# Patient Record
Sex: FEMALE | Race: WHITE | NOT HISPANIC OR LATINO | Employment: OTHER | URBAN - METROPOLITAN AREA
[De-identification: names, ages, dates, MRNs, and addresses within clinical notes are randomized per-mention and may not be internally consistent; named-entity substitution may affect disease eponyms.]

---

## 2018-01-23 ENCOUNTER — HOSPITAL ENCOUNTER (OUTPATIENT)
Dept: LAB | Facility: MEDICAL CENTER | Age: 68
End: 2018-01-23
Attending: NURSE PRACTITIONER
Payer: MEDICARE

## 2018-01-23 LAB
ALBUMIN SERPL BCP-MCNC: 4.3 G/DL (ref 3.2–4.9)
ALBUMIN/GLOB SERPL: 1.2 G/DL
ALP SERPL-CCNC: 78 U/L (ref 30–99)
ALT SERPL-CCNC: 23 U/L (ref 2–50)
ANION GAP SERPL CALC-SCNC: 6 MMOL/L (ref 0–11.9)
APPEARANCE UR: CLEAR
AST SERPL-CCNC: 25 U/L (ref 12–45)
BASOPHILS # BLD AUTO: 0.5 % (ref 0–1.8)
BASOPHILS # BLD: 0.04 K/UL (ref 0–0.12)
BILIRUB SERPL-MCNC: 0.3 MG/DL (ref 0.1–1.5)
BILIRUB UR QL STRIP.AUTO: NEGATIVE
BUN SERPL-MCNC: 15 MG/DL (ref 8–22)
CALCIUM SERPL-MCNC: 9.4 MG/DL (ref 8.5–10.5)
CHLORIDE SERPL-SCNC: 102 MMOL/L (ref 96–112)
CHOLEST SERPL-MCNC: 242 MG/DL (ref 100–199)
CO2 SERPL-SCNC: 30 MMOL/L (ref 20–33)
COLOR UR: YELLOW
CREAT SERPL-MCNC: 0.65 MG/DL (ref 0.5–1.4)
CULTURE IF INDICATED INDCX: NO UA CULTURE
EOSINOPHIL # BLD AUTO: 0.29 K/UL (ref 0–0.51)
EOSINOPHIL NFR BLD: 3.8 % (ref 0–6.9)
ERYTHROCYTE [DISTWIDTH] IN BLOOD BY AUTOMATED COUNT: 42.4 FL (ref 35.9–50)
GLOBULIN SER CALC-MCNC: 3.6 G/DL (ref 1.9–3.5)
GLUCOSE SERPL-MCNC: 92 MG/DL (ref 65–99)
GLUCOSE UR STRIP.AUTO-MCNC: NEGATIVE MG/DL
HCT VFR BLD AUTO: 51.3 % (ref 37–47)
HDLC SERPL-MCNC: 64 MG/DL
HGB BLD-MCNC: 16.4 G/DL (ref 12–16)
IMM GRANULOCYTES # BLD AUTO: 0.03 K/UL (ref 0–0.11)
IMM GRANULOCYTES NFR BLD AUTO: 0.4 % (ref 0–0.9)
KETONES UR STRIP.AUTO-MCNC: NEGATIVE MG/DL
LDLC SERPL CALC-MCNC: 156 MG/DL
LEUKOCYTE ESTERASE UR QL STRIP.AUTO: NEGATIVE
LYMPHOCYTES # BLD AUTO: 2.1 K/UL (ref 1–4.8)
LYMPHOCYTES NFR BLD: 27.7 % (ref 22–41)
MCH RBC QN AUTO: 28.9 PG (ref 27–33)
MCHC RBC AUTO-ENTMCNC: 32 G/DL (ref 33.6–35)
MCV RBC AUTO: 90.3 FL (ref 81.4–97.8)
MICRO URNS: NORMAL
MONOCYTES # BLD AUTO: 0.52 K/UL (ref 0–0.85)
MONOCYTES NFR BLD AUTO: 6.9 % (ref 0–13.4)
NEUTROPHILS # BLD AUTO: 4.59 K/UL (ref 2–7.15)
NEUTROPHILS NFR BLD: 60.7 % (ref 44–72)
NITRITE UR QL STRIP.AUTO: NEGATIVE
NRBC # BLD AUTO: 0 K/UL
NRBC BLD-RTO: 0 /100 WBC
PH UR STRIP.AUTO: 6 [PH]
PLATELET # BLD AUTO: 255 K/UL (ref 164–446)
PMV BLD AUTO: 12 FL (ref 9–12.9)
POTASSIUM SERPL-SCNC: 4.5 MMOL/L (ref 3.6–5.5)
PROT SERPL-MCNC: 7.9 G/DL (ref 6–8.2)
PROT UR QL STRIP: NEGATIVE MG/DL
RBC # BLD AUTO: 5.68 M/UL (ref 4.2–5.4)
RBC UR QL AUTO: NEGATIVE
SODIUM SERPL-SCNC: 138 MMOL/L (ref 135–145)
SP GR UR STRIP.AUTO: 1.03
T4 FREE SERPL-MCNC: 0.81 NG/DL (ref 0.53–1.43)
TRIGL SERPL-MCNC: 111 MG/DL (ref 0–149)
TSH SERPL DL<=0.005 MIU/L-ACNC: 1.73 UIU/ML (ref 0.38–5.33)
UROBILINOGEN UR STRIP.AUTO-MCNC: 0.2 MG/DL
VIT B12 SERPL-MCNC: 745 PG/ML (ref 211–911)
WBC # BLD AUTO: 7.6 K/UL (ref 4.8–10.8)

## 2018-01-23 PROCEDURE — 82607 VITAMIN B-12: CPT

## 2018-01-23 PROCEDURE — 80061 LIPID PANEL: CPT

## 2018-01-23 PROCEDURE — 36415 COLL VENOUS BLD VENIPUNCTURE: CPT

## 2018-01-23 PROCEDURE — 84443 ASSAY THYROID STIM HORMONE: CPT

## 2018-01-23 PROCEDURE — 84439 ASSAY OF FREE THYROXINE: CPT

## 2018-01-23 PROCEDURE — 80053 COMPREHEN METABOLIC PANEL: CPT

## 2018-01-23 PROCEDURE — 81003 URINALYSIS AUTO W/O SCOPE: CPT

## 2018-01-23 PROCEDURE — 85025 COMPLETE CBC W/AUTO DIFF WBC: CPT

## 2018-03-23 ENCOUNTER — HOSPITAL ENCOUNTER (OUTPATIENT)
Facility: MEDICAL CENTER | Age: 68
End: 2018-03-23
Attending: NURSE PRACTITIONER
Payer: MEDICARE

## 2018-03-23 ENCOUNTER — OFFICE VISIT (OUTPATIENT)
Dept: URGENT CARE | Facility: CLINIC | Age: 68
End: 2018-03-23
Payer: MEDICARE

## 2018-03-23 VITALS
HEIGHT: 66 IN | OXYGEN SATURATION: 93 % | WEIGHT: 262.35 LBS | DIASTOLIC BLOOD PRESSURE: 82 MMHG | RESPIRATION RATE: 18 BRPM | SYSTOLIC BLOOD PRESSURE: 130 MMHG | HEART RATE: 82 BPM | BODY MASS INDEX: 42.16 KG/M2 | TEMPERATURE: 98.6 F

## 2018-03-23 DIAGNOSIS — T36.95XA ANTIBIOTIC-INDUCED YEAST INFECTION: ICD-10-CM

## 2018-03-23 DIAGNOSIS — B37.9 ANTIBIOTIC-INDUCED YEAST INFECTION: ICD-10-CM

## 2018-03-23 DIAGNOSIS — J02.9 SORE THROAT: ICD-10-CM

## 2018-03-23 DIAGNOSIS — J02.9 ACUTE PHARYNGITIS, UNSPECIFIED ETIOLOGY: ICD-10-CM

## 2018-03-23 LAB
INT CON NEG: NORMAL
INT CON POS: NORMAL
S PYO AG THROAT QL: NEGATIVE

## 2018-03-23 PROCEDURE — 87070 CULTURE OTHR SPECIMN AEROBIC: CPT

## 2018-03-23 PROCEDURE — 99204 OFFICE O/P NEW MOD 45 MIN: CPT | Performed by: NURSE PRACTITIONER

## 2018-03-23 PROCEDURE — 87880 STREP A ASSAY W/OPTIC: CPT | Performed by: NURSE PRACTITIONER

## 2018-03-23 PROCEDURE — 87077 CULTURE AEROBIC IDENTIFY: CPT

## 2018-03-23 PROCEDURE — 99000 SPECIMEN HANDLING OFFICE-LAB: CPT | Performed by: NURSE PRACTITIONER

## 2018-03-23 RX ORDER — PENICILLIN V POTASSIUM 500 MG/1
500 TABLET ORAL 3 TIMES DAILY
Qty: 30 TAB | Refills: 0 | Status: SHIPPED | OUTPATIENT
Start: 2018-03-23 | End: 2018-04-02

## 2018-03-23 RX ORDER — FLUCONAZOLE 150 MG/1
150 TABLET ORAL DAILY
Qty: 1 TAB | Refills: 0 | Status: SHIPPED | OUTPATIENT
Start: 2018-03-23 | End: 2021-04-29

## 2018-03-23 RX ORDER — AZITHROMYCIN 250 MG/1
TABLET, FILM COATED ORAL
COMMUNITY
Start: 2018-03-21 | End: 2021-04-29

## 2018-03-23 RX ORDER — OSELTAMIVIR PHOSPHATE 75 MG/1
CAPSULE ORAL
COMMUNITY
Start: 2018-03-21 | End: 2021-04-29

## 2018-03-23 RX ORDER — LISINOPRIL 10 MG/1
10 TABLET ORAL DAILY
COMMUNITY
End: 2021-04-29

## 2018-03-23 NOTE — PROGRESS NOTES
Chief Complaint   Patient presents with   • Pharyngitis     with difficulty swallowing, noticed white patches in throat x3 days - pt took tamiflu and azithromycin       HISTORY OF PRESENT ILLNESS: Patient is a 68 y.o. female who presents today due to complaints of a sore throat for the past three days. Reports associated fever, chills, pain with swallowing. Also admits to cough since onset but feel the cough is directly related to drainage from her throat. Pain is currently rated as significant. Denies associated congestion, ear pain, or difficulty breathing. Denies known ill contacts. She was seen by her PCP two days ago for the same. At that time she was placed on azithromycin and Tamiflu without testing for either strep or influenza. She reports worsening of symptoms since onset. If she receives antibiotics today she is requesting a prescription for Diflucan as she is prone to antibiotic-induced yeast infections.       Patient Active Problem List    Diagnosis Date Noted   • Insomnia 06/18/2014   • Depression 06/18/2014       Allergies:Patient has no known allergies.    Current Outpatient Prescriptions Ordered in [facility]   Medication Sig Dispense Refill   • lisinopril (PRINIVIL) 10 MG Tab Take 10 mg by mouth every day.     • maalox plus-benadryl-visc lidocaine (MAGIC MOUTHWASH) Take 5 mL by mouth every 6 hours as needed. 100 mL 0   • fluconazole (DIFLUCAN) 150 MG tablet Take 1 Tab by mouth every day. 1 Tab 0   • penicillin v potassium (VEETID) 500 MG Tab Take 1 Tab by mouth 3 times a day for 10 days. 30 Tab 0   • azithromycin (ZITHROMAX) 250 MG Tab      • oseltamivir (TAMIFLU) 75 MG Cap        No current Epic-ordered facility-administered medications on file.        Past Medical History:   Diagnosis Date   • Hypertension     off meds since 2012       Social History   Substance Use Topics   • Smoking status: Never Smoker   • Smokeless tobacco: Never Used   • Alcohol use Yes      Comment: socially        Family Status  "  Relation Status   • Mother    • Father Alive   • Sister    • Sister Alive     Family History   Problem Relation Age of Onset   • Cancer Mother      throat  - smoker   • Other Father      My. Gravis   • Cancer Sister      ovarian - age 67   • Cancer Sister      melanoma       ROS:  Review of Systems   Constitutional: Positive for fever, chills. Negative for weight loss and malaise/fatigue.   HENT: Positive for sore throat. Negative for ear pain, nosebleeds, congestion.   Eyes: Negative for vision changes.   Cardiovascular: Negative for chest pain, palpitations, orthopnea and leg swelling.   Respiratory: Positive for cough. Negative for sputum production, shortness of breath and wheezing.   Gastrointestinal: Negative for abdominal pain, nausea, vomiting or diarrhea.   Skin: Negative for rash, diaphoresis.     Exam:  Blood pressure 130/82, pulse 82, temperature 37 °C (98.6 °F), resp. rate 18, height 1.676 m (5' 5.98\"), weight 119 kg (262 lb 5.6 oz), SpO2 93 %.  General: well-nourished, well-developed female in NAD  Head: normocephalic, atraumatic  Eyes: PERRLA, no conjunctival injection, acuity grossly intact, lids normal.  Ears: normal shape and symmetry, no tenderness, no discharge. External canals are without any significant edema or erythema. Tympanic membranes are without any inflammation, no effusion. Gross auditory acuity is intact.  Nose: symmetrical without tenderness, no discharge.  Mouth/Throat: reasonable hygiene. There is erythema, without exudates, + tonsillar enlargement present +2.  Neck: no masses, range of motion within normal limits, no tracheal deviation. No obvious thyroid enlargement.   Lymph: bilateral anterior cervical adenopathy. No supraclavicular adenopathy.   Neuro: alert and oriented. Cranial nerves 1-12 grossly intact. No sensory deficit.   Cardiovascular: regular rate and rhythm. No edema.  Pulmonary: no distress. Chest is symmetrical with respiration, no wheezes, " crackles, or rhonchi.   Musculoskeletal: no clubbing, appropriate muscle tone, gait is stable.  Skin: warm, dry, intact, no clubbing, no cyanosis, no rashes.   Psych: appropriate mood, affect, judgement.         Assessment/Plan:  1. Acute pharyngitis, unspecified etiology  penicillin v potassium (VEETID) 500 MG Tab    CULTURE THROAT   2. Sore throat  POCT Rapid Strep A    maalox plus-benadryl-visc lidocaine (MAGIC MOUTHWASH)    penicillin v potassium (VEETID) 500 MG Tab    CULTURE THROAT   3. Antibiotic-induced yeast infection  fluconazole (DIFLUCAN) 150 MG tablet         POC strep negative      I discussed with the patient that her symptoms may be viral and/or bacterial without response to azithromycin. Ultrasound is obtained although may be negative due to prior antibiotic usage. She's been instructed to discontinue azithromycin and start penicillin instead for better coverage. I will also encourage the patient to discontinue Tamiflu as her symptoms are not appearing influenza-like. OTC motrin or tylenol for pain/fever control. Hand hygiene. Increase fluid intake, rest. Warm salt water gargles. Probiotic use strongly encouraged. Contingent Diflucan as directed. MBX PRN.  Supportive care, differential diagnoses, and indications for immediate follow-up discussed with patient.   Pathogenesis of diagnosis discussed including typical length and natural progression.   Instructed to return to clinic or nearest emergency department for any change in condition, further concerns, or worsening of symptoms.  Patient states understanding of the plan of care and discharge instructions.  Instructed to make an appointment, for follow up, with her primary care provider.        Please note that this dictation was created using voice recognition software. I have made every reasonable attempt to correct obvious errors, but I expect that there are errors of grammar and possibly content that I did not discover before finalizing the  note.      COSTA Josue.

## 2018-03-24 DIAGNOSIS — J02.9 ACUTE PHARYNGITIS, UNSPECIFIED ETIOLOGY: ICD-10-CM

## 2018-03-24 DIAGNOSIS — J02.9 SORE THROAT: ICD-10-CM

## 2018-03-26 LAB
BACTERIA SPEC RESP CULT: ABNORMAL
BACTERIA SPEC RESP CULT: ABNORMAL
SIGNIFICANT IND 70042: ABNORMAL
SITE SITE: ABNORMAL
SOURCE SOURCE: ABNORMAL

## 2018-03-28 ENCOUNTER — TELEPHONE (OUTPATIENT)
Dept: URGENT CARE | Facility: PHYSICIAN GROUP | Age: 68
End: 2018-03-28

## 2018-03-28 NOTE — TELEPHONE ENCOUNTER
The patient was called for re-evaluation, throat culture positive for strep C, patient prescribed PCN and encouraged to continue, a message was left, encouraged to call back to the clinic or return to clinic with any questions or concerns.

## 2018-04-05 ENCOUNTER — APPOINTMENT (RX ONLY)
Dept: URBAN - METROPOLITAN AREA CLINIC 4 | Facility: CLINIC | Age: 68
Setting detail: DERMATOLOGY
End: 2018-04-05

## 2018-04-05 DIAGNOSIS — L81.4 OTHER MELANIN HYPERPIGMENTATION: ICD-10-CM

## 2018-04-05 DIAGNOSIS — D18.0 HEMANGIOMA: ICD-10-CM

## 2018-04-05 DIAGNOSIS — D22 MELANOCYTIC NEVI: ICD-10-CM

## 2018-04-05 DIAGNOSIS — L82.1 OTHER SEBORRHEIC KERATOSIS: ICD-10-CM

## 2018-04-05 DIAGNOSIS — L73.8 OTHER SPECIFIED FOLLICULAR DISORDERS: ICD-10-CM

## 2018-04-05 PROBLEM — D22.61 MELANOCYTIC NEVI OF RIGHT UPPER LIMB, INCLUDING SHOULDER: Status: ACTIVE | Noted: 2018-04-05

## 2018-04-05 PROBLEM — I10 ESSENTIAL (PRIMARY) HYPERTENSION: Status: ACTIVE | Noted: 2018-04-05

## 2018-04-05 PROBLEM — D22.39 MELANOCYTIC NEVI OF OTHER PARTS OF FACE: Status: ACTIVE | Noted: 2018-04-05

## 2018-04-05 PROBLEM — D22.62 MELANOCYTIC NEVI OF LEFT UPPER LIMB, INCLUDING SHOULDER: Status: ACTIVE | Noted: 2018-04-05

## 2018-04-05 PROBLEM — D18.01 HEMANGIOMA OF SKIN AND SUBCUTANEOUS TISSUE: Status: ACTIVE | Noted: 2018-04-05

## 2018-04-05 PROCEDURE — 99202 OFFICE O/P NEW SF 15 MIN: CPT

## 2018-04-05 PROCEDURE — ? COUNSELING

## 2018-04-05 ASSESSMENT — LOCATION DETAILED DESCRIPTION DERM
LOCATION DETAILED: RIGHT CENTRAL MALAR CHEEK
LOCATION DETAILED: PERIUMBILICAL SKIN
LOCATION DETAILED: RIGHT DISTAL DORSAL FOREARM
LOCATION DETAILED: LEFT PROXIMAL DORSAL FOREARM
LOCATION DETAILED: LEFT CENTRAL MALAR CHEEK
LOCATION DETAILED: LEFT INFERIOR MEDIAL MALAR CHEEK
LOCATION DETAILED: RIGHT PROXIMAL DORSAL FOREARM
LOCATION DETAILED: LEFT INFERIOR CENTRAL MALAR CHEEK
LOCATION DETAILED: NASAL SUPRATIP
LOCATION DETAILED: LEFT DISTAL DORSAL FOREARM

## 2018-04-05 ASSESSMENT — LOCATION SIMPLE DESCRIPTION DERM
LOCATION SIMPLE: LEFT FOREARM
LOCATION SIMPLE: NOSE
LOCATION SIMPLE: RIGHT CHEEK
LOCATION SIMPLE: LEFT CHEEK
LOCATION SIMPLE: ABDOMEN
LOCATION SIMPLE: RIGHT FOREARM

## 2018-04-05 ASSESSMENT — LOCATION ZONE DERM
LOCATION ZONE: NOSE
LOCATION ZONE: ARM
LOCATION ZONE: TRUNK
LOCATION ZONE: FACE

## 2018-06-01 ENCOUNTER — HOSPITAL ENCOUNTER (OUTPATIENT)
Dept: LAB | Facility: MEDICAL CENTER | Age: 68
End: 2018-06-01
Attending: NURSE PRACTITIONER
Payer: MEDICARE

## 2018-06-01 LAB
ALBUMIN SERPL BCP-MCNC: 4 G/DL (ref 3.2–4.9)
ALBUMIN/GLOB SERPL: 1.1 G/DL
ALP SERPL-CCNC: 71 U/L (ref 30–99)
ALT SERPL-CCNC: 16 U/L (ref 2–50)
ANION GAP SERPL CALC-SCNC: 6 MMOL/L (ref 0–11.9)
AST SERPL-CCNC: 17 U/L (ref 12–45)
BASOPHILS # BLD AUTO: 0.9 % (ref 0–1.8)
BASOPHILS # BLD: 0.06 K/UL (ref 0–0.12)
BILIRUB SERPL-MCNC: 0.3 MG/DL (ref 0.1–1.5)
BUN SERPL-MCNC: 17 MG/DL (ref 8–22)
CALCIUM SERPL-MCNC: 9.5 MG/DL (ref 8.5–10.5)
CHLORIDE SERPL-SCNC: 101 MMOL/L (ref 96–112)
CHOLEST SERPL-MCNC: 222 MG/DL (ref 100–199)
CO2 SERPL-SCNC: 28 MMOL/L (ref 20–33)
CREAT SERPL-MCNC: 0.67 MG/DL (ref 0.5–1.4)
EOSINOPHIL # BLD AUTO: 0.3 K/UL (ref 0–0.51)
EOSINOPHIL NFR BLD: 4.4 % (ref 0–6.9)
ERYTHROCYTE [DISTWIDTH] IN BLOOD BY AUTOMATED COUNT: 44 FL (ref 35.9–50)
GLOBULIN SER CALC-MCNC: 3.5 G/DL (ref 1.9–3.5)
GLUCOSE SERPL-MCNC: 93 MG/DL (ref 65–99)
HCT VFR BLD AUTO: 51.2 % (ref 37–47)
HDLC SERPL-MCNC: 53 MG/DL
HGB BLD-MCNC: 15.7 G/DL (ref 12–16)
IMM GRANULOCYTES # BLD AUTO: 0.02 K/UL (ref 0–0.11)
IMM GRANULOCYTES NFR BLD AUTO: 0.3 % (ref 0–0.9)
LDLC SERPL CALC-MCNC: 147 MG/DL
LYMPHOCYTES # BLD AUTO: 2.19 K/UL (ref 1–4.8)
LYMPHOCYTES NFR BLD: 32.3 % (ref 22–41)
MCH RBC QN AUTO: 27.6 PG (ref 27–33)
MCHC RBC AUTO-ENTMCNC: 30.7 G/DL (ref 33.6–35)
MCV RBC AUTO: 90.1 FL (ref 81.4–97.8)
MONOCYTES # BLD AUTO: 0.42 K/UL (ref 0–0.85)
MONOCYTES NFR BLD AUTO: 6.2 % (ref 0–13.4)
NEUTROPHILS # BLD AUTO: 3.79 K/UL (ref 2–7.15)
NEUTROPHILS NFR BLD: 55.9 % (ref 44–72)
NRBC # BLD AUTO: 0 K/UL
NRBC BLD-RTO: 0 /100 WBC
PLATELET # BLD AUTO: 257 K/UL (ref 164–446)
PMV BLD AUTO: 12.3 FL (ref 9–12.9)
POTASSIUM SERPL-SCNC: 4.4 MMOL/L (ref 3.6–5.5)
PROT SERPL-MCNC: 7.5 G/DL (ref 6–8.2)
RBC # BLD AUTO: 5.68 M/UL (ref 4.2–5.4)
SODIUM SERPL-SCNC: 135 MMOL/L (ref 135–145)
TRIGL SERPL-MCNC: 112 MG/DL (ref 0–149)
WBC # BLD AUTO: 6.8 K/UL (ref 4.8–10.8)

## 2018-06-01 PROCEDURE — 80061 LIPID PANEL: CPT

## 2018-06-01 PROCEDURE — 36415 COLL VENOUS BLD VENIPUNCTURE: CPT

## 2018-06-01 PROCEDURE — 85025 COMPLETE CBC W/AUTO DIFF WBC: CPT

## 2018-06-01 PROCEDURE — 80053 COMPREHEN METABOLIC PANEL: CPT

## 2019-09-19 ENCOUNTER — HOSPITAL ENCOUNTER (OUTPATIENT)
Dept: LAB | Facility: MEDICAL CENTER | Age: 69
End: 2019-09-19
Attending: NURSE PRACTITIONER
Payer: MEDICARE

## 2019-09-19 LAB
25(OH)D3 SERPL-MCNC: 22 NG/ML (ref 30–100)
ALBUMIN SERPL BCP-MCNC: 4.5 G/DL (ref 3.2–4.9)
ALBUMIN/GLOB SERPL: 1.1 G/DL
ALP SERPL-CCNC: 95 U/L (ref 30–99)
ALT SERPL-CCNC: 91 U/L (ref 2–50)
ANION GAP SERPL CALC-SCNC: 9 MMOL/L (ref 0–11.9)
APPEARANCE UR: ABNORMAL
AST SERPL-CCNC: 64 U/L (ref 12–45)
BACTERIA #/AREA URNS HPF: NEGATIVE /HPF
BASOPHILS # BLD AUTO: 0.6 % (ref 0–1.8)
BASOPHILS # BLD: 0.04 K/UL (ref 0–0.12)
BILIRUB SERPL-MCNC: 0.5 MG/DL (ref 0.1–1.5)
BILIRUB UR QL STRIP.AUTO: NEGATIVE
BUN SERPL-MCNC: 16 MG/DL (ref 8–22)
CALCIUM SERPL-MCNC: 9.5 MG/DL (ref 8.5–10.5)
CHLORIDE SERPL-SCNC: 99 MMOL/L (ref 96–112)
CHOLEST SERPL-MCNC: 233 MG/DL (ref 100–199)
CO2 SERPL-SCNC: 27 MMOL/L (ref 20–33)
COLOR UR: YELLOW
COMMENT 1642: NORMAL
CREAT SERPL-MCNC: 0.73 MG/DL (ref 0.5–1.4)
EOSINOPHIL # BLD AUTO: 0.49 K/UL (ref 0–0.51)
EOSINOPHIL NFR BLD: 7.3 % (ref 0–6.9)
EPI CELLS #/AREA URNS HPF: NEGATIVE /HPF
ERYTHROCYTE [DISTWIDTH] IN BLOOD BY AUTOMATED COUNT: 44.3 FL (ref 35.9–50)
FASTING STATUS PATIENT QL REPORTED: NORMAL
GLOBULIN SER CALC-MCNC: 4 G/DL (ref 1.9–3.5)
GLUCOSE SERPL-MCNC: 93 MG/DL (ref 65–99)
GLUCOSE UR STRIP.AUTO-MCNC: NEGATIVE MG/DL
HCT VFR BLD AUTO: 51 % (ref 37–47)
HDLC SERPL-MCNC: 46 MG/DL
HGB BLD-MCNC: 15.9 G/DL (ref 12–16)
HYALINE CASTS #/AREA URNS LPF: ABNORMAL /LPF
IMM GRANULOCYTES # BLD AUTO: 0 K/UL (ref 0–0.11)
IMM GRANULOCYTES NFR BLD AUTO: 0 % (ref 0–0.9)
KETONES UR STRIP.AUTO-MCNC: 15 MG/DL
LDLC SERPL CALC-MCNC: 164 MG/DL
LEUKOCYTE ESTERASE UR QL STRIP.AUTO: NEGATIVE
LYMPHOCYTES # BLD AUTO: 1.58 K/UL (ref 1–4.8)
LYMPHOCYTES NFR BLD: 23.4 % (ref 22–41)
MCH RBC QN AUTO: 28.5 PG (ref 27–33)
MCHC RBC AUTO-ENTMCNC: 31.2 G/DL (ref 33.6–35)
MCV RBC AUTO: 91.4 FL (ref 81.4–97.8)
MICRO URNS: ABNORMAL
MONOCYTES # BLD AUTO: 0.49 K/UL (ref 0–0.85)
MONOCYTES NFR BLD AUTO: 7.3 % (ref 0–13.4)
MORPHOLOGY BLD-IMP: NORMAL
NEUTROPHILS # BLD AUTO: 4.15 K/UL (ref 2–7.15)
NEUTROPHILS NFR BLD: 61.4 % (ref 44–72)
NITRITE UR QL STRIP.AUTO: NEGATIVE
NRBC # BLD AUTO: 0 K/UL
NRBC BLD-RTO: 0 /100 WBC
PH UR STRIP.AUTO: 6 [PH] (ref 5–8)
PLATELET # BLD AUTO: 217 K/UL (ref 164–446)
PMV BLD AUTO: 12.7 FL (ref 9–12.9)
POTASSIUM SERPL-SCNC: 4.1 MMOL/L (ref 3.6–5.5)
PROT SERPL-MCNC: 8.5 G/DL (ref 6–8.2)
PROT UR QL STRIP: NEGATIVE MG/DL
RBC # BLD AUTO: 5.58 M/UL (ref 4.2–5.4)
RBC # URNS HPF: ABNORMAL /HPF
RBC UR QL AUTO: NEGATIVE
SODIUM SERPL-SCNC: 135 MMOL/L (ref 135–145)
SP GR UR STRIP.AUTO: 1.01
TRIGL SERPL-MCNC: 117 MG/DL (ref 0–149)
TSH SERPL DL<=0.005 MIU/L-ACNC: 2.46 UIU/ML (ref 0.38–5.33)
UROBILINOGEN UR STRIP.AUTO-MCNC: 0.2 MG/DL
WBC # BLD AUTO: 6.8 K/UL (ref 4.8–10.8)
WBC #/AREA URNS HPF: ABNORMAL /HPF

## 2019-09-19 PROCEDURE — 81001 URINALYSIS AUTO W/SCOPE: CPT

## 2019-09-19 PROCEDURE — 80061 LIPID PANEL: CPT

## 2019-09-19 PROCEDURE — 80053 COMPREHEN METABOLIC PANEL: CPT

## 2019-09-19 PROCEDURE — 82306 VITAMIN D 25 HYDROXY: CPT

## 2019-09-19 PROCEDURE — 84443 ASSAY THYROID STIM HORMONE: CPT

## 2019-09-19 PROCEDURE — 85025 COMPLETE CBC W/AUTO DIFF WBC: CPT

## 2019-09-19 PROCEDURE — 36415 COLL VENOUS BLD VENIPUNCTURE: CPT

## 2020-10-14 ENCOUNTER — HOSPITAL ENCOUNTER (OUTPATIENT)
Dept: LAB | Facility: MEDICAL CENTER | Age: 70
End: 2020-10-14
Attending: NURSE PRACTITIONER
Payer: MEDICARE

## 2020-10-14 PROCEDURE — 36415 COLL VENOUS BLD VENIPUNCTURE: CPT

## 2020-10-14 PROCEDURE — 81003 URINALYSIS AUTO W/O SCOPE: CPT

## 2020-10-14 PROCEDURE — 80053 COMPREHEN METABOLIC PANEL: CPT

## 2020-10-14 PROCEDURE — 82306 VITAMIN D 25 HYDROXY: CPT

## 2020-10-14 PROCEDURE — 84443 ASSAY THYROID STIM HORMONE: CPT

## 2020-10-14 PROCEDURE — 85025 COMPLETE CBC W/AUTO DIFF WBC: CPT

## 2020-10-14 PROCEDURE — 80061 LIPID PANEL: CPT

## 2020-10-15 LAB
ALBUMIN SERPL BCP-MCNC: 4.7 G/DL (ref 3.2–4.9)
ALBUMIN/GLOB SERPL: 1.3 G/DL
ALP SERPL-CCNC: 93 U/L (ref 30–99)
ALT SERPL-CCNC: 20 U/L (ref 2–50)
ANION GAP SERPL CALC-SCNC: 11 MMOL/L (ref 7–16)
APPEARANCE UR: CLEAR
AST SERPL-CCNC: 21 U/L (ref 12–45)
BASOPHILS # BLD AUTO: 0.7 % (ref 0–1.8)
BASOPHILS # BLD: 0.05 K/UL (ref 0–0.12)
BILIRUB SERPL-MCNC: 0.4 MG/DL (ref 0.1–1.5)
BILIRUB UR QL STRIP.AUTO: NEGATIVE
BUN SERPL-MCNC: 16 MG/DL (ref 8–22)
CALCIUM SERPL-MCNC: 9.6 MG/DL (ref 8.5–10.5)
CHLORIDE SERPL-SCNC: 98 MMOL/L (ref 96–112)
CHOLEST SERPL-MCNC: 197 MG/DL (ref 100–199)
CO2 SERPL-SCNC: 27 MMOL/L (ref 20–33)
COLOR UR: YELLOW
CREAT SERPL-MCNC: 0.62 MG/DL (ref 0.5–1.4)
EOSINOPHIL # BLD AUTO: 0.33 K/UL (ref 0–0.51)
EOSINOPHIL NFR BLD: 4.3 % (ref 0–6.9)
ERYTHROCYTE [DISTWIDTH] IN BLOOD BY AUTOMATED COUNT: 43.4 FL (ref 35.9–50)
FASTING STATUS PATIENT QL REPORTED: NORMAL
GLOBULIN SER CALC-MCNC: 3.5 G/DL (ref 1.9–3.5)
GLUCOSE SERPL-MCNC: 89 MG/DL (ref 65–99)
GLUCOSE UR STRIP.AUTO-MCNC: NEGATIVE MG/DL
HCT VFR BLD AUTO: 50.6 % (ref 37–47)
HDLC SERPL-MCNC: 64 MG/DL
HGB BLD-MCNC: 15.8 G/DL (ref 12–16)
IMM GRANULOCYTES # BLD AUTO: 0.03 K/UL (ref 0–0.11)
IMM GRANULOCYTES NFR BLD AUTO: 0.4 % (ref 0–0.9)
KETONES UR STRIP.AUTO-MCNC: NEGATIVE MG/DL
LDLC SERPL CALC-MCNC: 103 MG/DL
LEUKOCYTE ESTERASE UR QL STRIP.AUTO: NEGATIVE
LYMPHOCYTES # BLD AUTO: 2.45 K/UL (ref 1–4.8)
LYMPHOCYTES NFR BLD: 32.2 % (ref 22–41)
MCH RBC QN AUTO: 28.4 PG (ref 27–33)
MCHC RBC AUTO-ENTMCNC: 31.2 G/DL (ref 33.6–35)
MCV RBC AUTO: 91 FL (ref 81.4–97.8)
MICRO URNS: NORMAL
MONOCYTES # BLD AUTO: 0.45 K/UL (ref 0–0.85)
MONOCYTES NFR BLD AUTO: 5.9 % (ref 0–13.4)
NEUTROPHILS # BLD AUTO: 4.29 K/UL (ref 2–7.15)
NEUTROPHILS NFR BLD: 56.5 % (ref 44–72)
NITRITE UR QL STRIP.AUTO: NEGATIVE
NRBC # BLD AUTO: 0 K/UL
NRBC BLD-RTO: 0 /100 WBC
PH UR STRIP.AUTO: 6 [PH] (ref 5–8)
PLATELET # BLD AUTO: 267 K/UL (ref 164–446)
PMV BLD AUTO: 12.8 FL (ref 9–12.9)
POTASSIUM SERPL-SCNC: 4.1 MMOL/L (ref 3.6–5.5)
PROT SERPL-MCNC: 8.2 G/DL (ref 6–8.2)
PROT UR QL STRIP: NEGATIVE MG/DL
RBC # BLD AUTO: 5.56 M/UL (ref 4.2–5.4)
RBC UR QL AUTO: NEGATIVE
SODIUM SERPL-SCNC: 136 MMOL/L (ref 135–145)
SP GR UR STRIP.AUTO: 1.01
TRIGL SERPL-MCNC: 151 MG/DL (ref 0–149)
TSH SERPL DL<=0.005 MIU/L-ACNC: 2.39 UIU/ML (ref 0.38–5.33)
UROBILINOGEN UR STRIP.AUTO-MCNC: 0.2 MG/DL
WBC # BLD AUTO: 7.6 K/UL (ref 4.8–10.8)

## 2020-10-16 LAB — 25(OH)D3 SERPL-MCNC: 39 NG/ML (ref 30–100)

## 2021-04-29 ENCOUNTER — HOSPITAL ENCOUNTER (INPATIENT)
Facility: MEDICAL CENTER | Age: 71
LOS: 1 days | DRG: 287 | End: 2021-05-01
Attending: EMERGENCY MEDICINE | Admitting: INTERNAL MEDICINE
Payer: MEDICARE

## 2021-04-29 ENCOUNTER — APPOINTMENT (OUTPATIENT)
Dept: RADIOLOGY | Facility: MEDICAL CENTER | Age: 71
DRG: 287 | End: 2021-04-29
Attending: EMERGENCY MEDICINE
Payer: MEDICARE

## 2021-04-29 ENCOUNTER — OFFICE VISIT (OUTPATIENT)
Dept: CARDIOLOGY | Facility: MEDICAL CENTER | Age: 71
End: 2021-04-29
Payer: MEDICARE

## 2021-04-29 ENCOUNTER — APPOINTMENT (OUTPATIENT)
Dept: CARDIOLOGY | Facility: MEDICAL CENTER | Age: 71
DRG: 287 | End: 2021-04-29
Attending: INTERNAL MEDICINE
Payer: MEDICARE

## 2021-04-29 VITALS
WEIGHT: 266 LBS | BODY MASS INDEX: 44.32 KG/M2 | OXYGEN SATURATION: 93 % | SYSTOLIC BLOOD PRESSURE: 150 MMHG | DIASTOLIC BLOOD PRESSURE: 88 MMHG | HEIGHT: 65 IN | HEART RATE: 94 BPM

## 2021-04-29 DIAGNOSIS — I47.10 SVT (SUPRAVENTRICULAR TACHYCARDIA) (HCC): ICD-10-CM

## 2021-04-29 DIAGNOSIS — R00.2 PALPITATIONS: Primary | ICD-10-CM

## 2021-04-29 DIAGNOSIS — R01.1 UNDIAGNOSED CARDIAC MURMURS: ICD-10-CM

## 2021-04-29 DIAGNOSIS — E78.2 MIXED HYPERLIPIDEMIA: ICD-10-CM

## 2021-04-29 DIAGNOSIS — I20.0 UNSTABLE ANGINA PECTORIS (HCC): ICD-10-CM

## 2021-04-29 DIAGNOSIS — I10 ESSENTIAL HYPERTENSION: ICD-10-CM

## 2021-04-29 DIAGNOSIS — Z86.79 H/O CARDIAC ARRHYTHMIA: ICD-10-CM

## 2021-04-29 PROBLEM — R07.9 PAIN IN THE CHEST: Status: ACTIVE | Noted: 2021-04-29

## 2021-04-29 PROBLEM — F41.9 ANXIETY: Status: ACTIVE | Noted: 2021-04-29

## 2021-04-29 PROBLEM — I25.9 CHEST PAIN DUE TO MYOCARDIAL ISCHEMIA: Status: ACTIVE | Noted: 2021-04-29

## 2021-04-29 LAB
ALBUMIN SERPL BCP-MCNC: 4.5 G/DL (ref 3.2–4.9)
ALBUMIN/GLOB SERPL: 1.2 G/DL
ALP SERPL-CCNC: 97 U/L (ref 30–99)
ALT SERPL-CCNC: 18 U/L (ref 2–50)
ANION GAP SERPL CALC-SCNC: 11 MMOL/L (ref 7–16)
AST SERPL-CCNC: 20 U/L (ref 12–45)
BASOPHILS # BLD AUTO: 0.3 % (ref 0–1.8)
BASOPHILS # BLD: 0.03 K/UL (ref 0–0.12)
BILIRUB SERPL-MCNC: 0.3 MG/DL (ref 0.1–1.5)
BUN SERPL-MCNC: 20 MG/DL (ref 8–22)
CALCIUM SERPL-MCNC: 9.5 MG/DL (ref 8.5–10.5)
CHLORIDE SERPL-SCNC: 102 MMOL/L (ref 96–112)
CHOLEST SERPL-MCNC: 159 MG/DL (ref 100–199)
CO2 SERPL-SCNC: 25 MMOL/L (ref 20–33)
CREAT SERPL-MCNC: 0.61 MG/DL (ref 0.5–1.4)
EKG IMPRESSION: NORMAL
EOSINOPHIL # BLD AUTO: 0.15 K/UL (ref 0–0.51)
EOSINOPHIL NFR BLD: 1.6 % (ref 0–6.9)
ERYTHROCYTE [DISTWIDTH] IN BLOOD BY AUTOMATED COUNT: 40.5 FL (ref 35.9–50)
GLOBULIN SER CALC-MCNC: 3.9 G/DL (ref 1.9–3.5)
GLUCOSE SERPL-MCNC: 100 MG/DL (ref 65–99)
HCT VFR BLD AUTO: 45.8 % (ref 37–47)
HDLC SERPL-MCNC: 65 MG/DL
HGB BLD-MCNC: 14.6 G/DL (ref 12–16)
IMM GRANULOCYTES # BLD AUTO: 0.03 K/UL (ref 0–0.11)
IMM GRANULOCYTES NFR BLD AUTO: 0.3 % (ref 0–0.9)
LDLC SERPL CALC-MCNC: 81 MG/DL
LV EJECT FRACT  99904: 70
LV EJECT FRACT MOD 2C 99903: 62.35
LYMPHOCYTES # BLD AUTO: 2.57 K/UL (ref 1–4.8)
LYMPHOCYTES NFR BLD: 26.6 % (ref 22–41)
MCH RBC QN AUTO: 28.6 PG (ref 27–33)
MCHC RBC AUTO-ENTMCNC: 31.9 G/DL (ref 33.6–35)
MCV RBC AUTO: 89.6 FL (ref 81.4–97.8)
MONOCYTES # BLD AUTO: 0.49 K/UL (ref 0–0.85)
MONOCYTES NFR BLD AUTO: 5.1 % (ref 0–13.4)
NEUTROPHILS # BLD AUTO: 6.4 K/UL (ref 2–7.15)
NEUTROPHILS NFR BLD: 66.1 % (ref 44–72)
NRBC # BLD AUTO: 0 K/UL
NRBC BLD-RTO: 0 /100 WBC
PLATELET # BLD AUTO: 260 K/UL (ref 164–446)
PMV BLD AUTO: 11.4 FL (ref 9–12.9)
POTASSIUM SERPL-SCNC: 4 MMOL/L (ref 3.6–5.5)
PROT SERPL-MCNC: 8.4 G/DL (ref 6–8.2)
RBC # BLD AUTO: 5.11 M/UL (ref 4.2–5.4)
SARS-COV+SARS-COV-2 AG RESP QL IA.RAPID: NOTDETECTED
SARS-COV-2 RNA RESP QL NAA+PROBE: NOTDETECTED
SODIUM SERPL-SCNC: 138 MMOL/L (ref 135–145)
SPECIMEN SOURCE: NORMAL
SPECIMEN SOURCE: NORMAL
TRIGL SERPL-MCNC: 64 MG/DL (ref 0–149)
TROPONIN T SERPL-MCNC: 6 NG/L (ref 6–19)
TSH SERPL DL<=0.005 MIU/L-ACNC: 1.75 UIU/ML (ref 0.38–5.33)
WBC # BLD AUTO: 9.7 K/UL (ref 4.8–10.8)

## 2021-04-29 PROCEDURE — U0005 INFEC AGEN DETEC AMPLI PROBE: HCPCS

## 2021-04-29 PROCEDURE — 93005 ELECTROCARDIOGRAM TRACING: CPT | Performed by: EMERGENCY MEDICINE

## 2021-04-29 PROCEDURE — U0003 INFECTIOUS AGENT DETECTION BY NUCLEIC ACID (DNA OR RNA); SEVERE ACUTE RESPIRATORY SYNDROME CORONAVIRUS 2 (SARS-COV-2) (CORONAVIRUS DISEASE [COVID-19]), AMPLIFIED PROBE TECHNIQUE, MAKING USE OF HIGH THROUGHPUT TECHNOLOGIES AS DESCRIBED BY CMS-2020-01-R: HCPCS

## 2021-04-29 PROCEDURE — 700117 HCHG RX CONTRAST REV CODE 255: Performed by: INTERNAL MEDICINE

## 2021-04-29 PROCEDURE — 99220 PR INITIAL OBSERVATION CARE,LEVL III: CPT | Performed by: INTERNAL MEDICINE

## 2021-04-29 PROCEDURE — 84484 ASSAY OF TROPONIN QUANT: CPT

## 2021-04-29 PROCEDURE — 80061 LIPID PANEL: CPT

## 2021-04-29 PROCEDURE — 71045 X-RAY EXAM CHEST 1 VIEW: CPT

## 2021-04-29 PROCEDURE — 93306 TTE W/DOPPLER COMPLETE: CPT | Mod: 26 | Performed by: INTERNAL MEDICINE

## 2021-04-29 PROCEDURE — 93000 ELECTROCARDIOGRAM COMPLETE: CPT | Performed by: INTERNAL MEDICINE

## 2021-04-29 PROCEDURE — 99285 EMERGENCY DEPT VISIT HI MDM: CPT

## 2021-04-29 PROCEDURE — 700102 HCHG RX REV CODE 250 W/ 637 OVERRIDE(OP): Performed by: INTERNAL MEDICINE

## 2021-04-29 PROCEDURE — 93005 ELECTROCARDIOGRAM TRACING: CPT

## 2021-04-29 PROCEDURE — 84443 ASSAY THYROID STIM HORMONE: CPT

## 2021-04-29 PROCEDURE — C9803 HOPD COVID-19 SPEC COLLECT: HCPCS | Performed by: EMERGENCY MEDICINE

## 2021-04-29 PROCEDURE — 93306 TTE W/DOPPLER COMPLETE: CPT

## 2021-04-29 PROCEDURE — A9270 NON-COVERED ITEM OR SERVICE: HCPCS | Performed by: INTERNAL MEDICINE

## 2021-04-29 PROCEDURE — 80053 COMPREHEN METABOLIC PANEL: CPT

## 2021-04-29 PROCEDURE — 94760 N-INVAS EAR/PLS OXIMETRY 1: CPT

## 2021-04-29 PROCEDURE — G0378 HOSPITAL OBSERVATION PER HR: HCPCS

## 2021-04-29 PROCEDURE — 85025 COMPLETE CBC W/AUTO DIFF WBC: CPT

## 2021-04-29 PROCEDURE — 87426 SARSCOV CORONAVIRUS AG IA: CPT

## 2021-04-29 PROCEDURE — 36415 COLL VENOUS BLD VENIPUNCTURE: CPT

## 2021-04-29 PROCEDURE — 99205 OFFICE O/P NEW HI 60 MIN: CPT | Performed by: INTERNAL MEDICINE

## 2021-04-29 RX ORDER — CETIRIZINE HYDROCHLORIDE 10 MG/1
10 TABLET ORAL DAILY
COMMUNITY

## 2021-04-29 RX ORDER — CETIRIZINE HYDROCHLORIDE 10 MG/1
10 TABLET ORAL DAILY
Status: DISCONTINUED | OUTPATIENT
Start: 2021-04-30 | End: 2021-05-01 | Stop reason: HOSPADM

## 2021-04-29 RX ORDER — AMOXICILLIN 250 MG
2 CAPSULE ORAL 2 TIMES DAILY
Status: DISCONTINUED | OUTPATIENT
Start: 2021-04-30 | End: 2021-05-01 | Stop reason: HOSPADM

## 2021-04-29 RX ORDER — ACETAMINOPHEN 325 MG/1
650 TABLET ORAL EVERY 6 HOURS PRN
Status: DISCONTINUED | OUTPATIENT
Start: 2021-04-29 | End: 2021-05-01 | Stop reason: HOSPADM

## 2021-04-29 RX ORDER — ASPIRIN 81 MG/1
324 TABLET, CHEWABLE ORAL ONCE
Status: DISCONTINUED | OUTPATIENT
Start: 2021-04-29 | End: 2021-04-29

## 2021-04-29 RX ORDER — VITAMIN B COMPLEX
1000 TABLET ORAL DAILY
Status: DISCONTINUED | OUTPATIENT
Start: 2021-04-30 | End: 2021-05-01 | Stop reason: HOSPADM

## 2021-04-29 RX ORDER — LABETALOL HYDROCHLORIDE 5 MG/ML
10 INJECTION, SOLUTION INTRAVENOUS EVERY 4 HOURS PRN
Status: DISCONTINUED | OUTPATIENT
Start: 2021-04-29 | End: 2021-05-01 | Stop reason: HOSPADM

## 2021-04-29 RX ORDER — NITROGLYCERIN 0.4 MG/1
0.4 TABLET SUBLINGUAL
Status: DISCONTINUED | OUTPATIENT
Start: 2021-04-29 | End: 2021-05-01 | Stop reason: HOSPADM

## 2021-04-29 RX ORDER — ROSUVASTATIN CALCIUM 20 MG/1
20 TABLET, COATED ORAL EVERY EVENING
Status: DISCONTINUED | OUTPATIENT
Start: 2021-04-29 | End: 2021-04-29

## 2021-04-29 RX ORDER — LORAZEPAM 0.5 MG/1
0.5 TABLET ORAL EVERY 6 HOURS PRN
Status: DISCONTINUED | OUTPATIENT
Start: 2021-04-29 | End: 2021-04-30

## 2021-04-29 RX ORDER — ONDANSETRON 4 MG/1
4 TABLET, ORALLY DISINTEGRATING ORAL EVERY 4 HOURS PRN
Status: DISCONTINUED | OUTPATIENT
Start: 2021-04-29 | End: 2021-05-01 | Stop reason: HOSPADM

## 2021-04-29 RX ORDER — POLYETHYLENE GLYCOL 3350 17 G/17G
1 POWDER, FOR SOLUTION ORAL
Status: DISCONTINUED | OUTPATIENT
Start: 2021-04-29 | End: 2021-05-01 | Stop reason: HOSPADM

## 2021-04-29 RX ORDER — ATORVASTATIN CALCIUM 10 MG/1
10 TABLET, FILM COATED ORAL
COMMUNITY
Start: 2021-04-11 | End: 2022-08-30 | Stop reason: SDUPTHER

## 2021-04-29 RX ORDER — LOSARTAN POTASSIUM 50 MG/1
50 TABLET ORAL
Status: DISCONTINUED | OUTPATIENT
Start: 2021-04-30 | End: 2021-05-01

## 2021-04-29 RX ORDER — ASCORBIC ACID 500 MG
1000 TABLET ORAL DAILY
COMMUNITY

## 2021-04-29 RX ORDER — ATORVASTATIN CALCIUM 10 MG/1
10 TABLET, FILM COATED ORAL
Status: DISCONTINUED | OUTPATIENT
Start: 2021-04-29 | End: 2021-05-01 | Stop reason: HOSPADM

## 2021-04-29 RX ORDER — ONDANSETRON 2 MG/ML
4 INJECTION INTRAMUSCULAR; INTRAVENOUS EVERY 4 HOURS PRN
Status: DISCONTINUED | OUTPATIENT
Start: 2021-04-29 | End: 2021-05-01 | Stop reason: HOSPADM

## 2021-04-29 RX ORDER — ASCORBIC ACID 500 MG
1000 TABLET ORAL DAILY
Status: DISCONTINUED | OUTPATIENT
Start: 2021-04-30 | End: 2021-05-01 | Stop reason: HOSPADM

## 2021-04-29 RX ORDER — BISACODYL 10 MG
10 SUPPOSITORY, RECTAL RECTAL
Status: DISCONTINUED | OUTPATIENT
Start: 2021-04-29 | End: 2021-05-01 | Stop reason: HOSPADM

## 2021-04-29 RX ORDER — LOSARTAN POTASSIUM 50 MG/1
50 TABLET ORAL
Status: ON HOLD | COMMUNITY
Start: 2021-04-19 | End: 2021-05-01 | Stop reason: SDUPTHER

## 2021-04-29 RX ORDER — ASPIRIN 81 MG/1
81 TABLET, CHEWABLE ORAL EVERY EVENING
Status: DISCONTINUED | OUTPATIENT
Start: 2021-04-29 | End: 2021-05-01 | Stop reason: HOSPADM

## 2021-04-29 RX ORDER — ASPIRIN 81 MG/1
81 TABLET, CHEWABLE ORAL EVERY EVENING
COMMUNITY

## 2021-04-29 RX ADMIN — HUMAN ALBUMIN MICROSPHERES AND PERFLUTREN 3 ML: 10; .22 INJECTION, SOLUTION INTRAVENOUS at 18:09

## 2021-04-29 RX ADMIN — ACETAMINOPHEN 650 MG: 325 TABLET, FILM COATED ORAL at 20:18

## 2021-04-29 RX ADMIN — ASPIRIN 81 MG: 81 TABLET, CHEWABLE ORAL at 20:18

## 2021-04-29 RX ADMIN — NITROGLYCERIN 0.5 INCH: 20 OINTMENT TOPICAL at 21:26

## 2021-04-29 RX ADMIN — ATORVASTATIN CALCIUM 10 MG: 10 TABLET, FILM COATED ORAL at 20:18

## 2021-04-29 RX ADMIN — LORAZEPAM 0.5 MG: 0.5 TABLET ORAL at 20:17

## 2021-04-29 ASSESSMENT — COPD QUESTIONNAIRES
DO YOU EVER COUGH UP ANY MUCUS OR PHLEGM?: YES, A FEW DAYS A WEEK OR MONTH
HAVE YOU SMOKED AT LEAST 100 CIGARETTES IN YOUR ENTIRE LIFE: NO/DON'T KNOW
COPD SCREENING SCORE: 5
DURING THE PAST 4 WEEKS HOW MUCH DID YOU FEEL SHORT OF BREATH: SOME OF THE TIME

## 2021-04-29 ASSESSMENT — ENCOUNTER SYMPTOMS
DOUBLE VISION: 0
FLANK PAIN: 0
VOMITING: 0
HEARTBURN: 0
CHILLS: 0
NAUSEA: 0
BRUISES/BLEEDS EASILY: 0
FEVER: 0
WEAKNESS: 0
BACK PAIN: 0
COUGH: 0
SPEECH CHANGE: 0
SPUTUM PRODUCTION: 0
POLYDIPSIA: 0
CONSTITUTIONAL NEGATIVE: 1
DIZZINESS: 0
HALLUCINATIONS: 0
EYES NEGATIVE: 1
NEUROLOGICAL NEGATIVE: 1
ORTHOPNEA: 0
PALPITATIONS: 1
MUSCULOSKELETAL NEGATIVE: 1
PHOTOPHOBIA: 0
GASTROINTESTINAL NEGATIVE: 1
ORTHOPNEA: 0
DIZZINESS: 1
FEVER: 0
FOCAL WEAKNESS: 0
LOSS OF CONSCIOUSNESS: 0
PND: 0
COUGH: 0
WEIGHT LOSS: 0
WHEEZING: 0
CLAUDICATION: 0
BRUISES/BLEEDS EASILY: 0
CHILLS: 0
HEADACHES: 0
BLURRED VISION: 0
PALPITATIONS: 0
STRIDOR: 0
SHORTNESS OF BREATH: 1
TREMORS: 0
NECK PAIN: 0
HEMOPTYSIS: 0
SORE THROAT: 0
NERVOUS/ANXIOUS: 0
SPUTUM PRODUCTION: 0
HEMOPTYSIS: 0

## 2021-04-29 ASSESSMENT — COGNITIVE AND FUNCTIONAL STATUS - GENERAL
SUGGESTED CMS G CODE MODIFIER MOBILITY: CH
MOBILITY SCORE: 24
SUGGESTED CMS G CODE MODIFIER DAILY ACTIVITY: CH
DAILY ACTIVITIY SCORE: 24

## 2021-04-29 ASSESSMENT — LIFESTYLE VARIABLES
TOTAL SCORE: 0
AVERAGE NUMBER OF DAYS PER WEEK YOU HAVE A DRINK CONTAINING ALCOHOL: 0
EVER HAD A DRINK FIRST THING IN THE MORNING TO STEADY YOUR NERVES TO GET RID OF A HANGOVER: NO
HAVE YOU EVER FELT YOU SHOULD CUT DOWN ON YOUR DRINKING: NO
EVER FELT BAD OR GUILTY ABOUT YOUR DRINKING: NO
CONSUMPTION TOTAL: NEGATIVE
ALCOHOL_USE: YES
SUBSTANCE_ABUSE: 0
HAVE PEOPLE ANNOYED YOU BY CRITICIZING YOUR DRINKING: NO
TOTAL SCORE: 0
HOW MANY TIMES IN THE PAST YEAR HAVE YOU HAD 5 OR MORE DRINKS IN A DAY: 0
TOTAL SCORE: 0
DOES PATIENT WANT TO STOP DRINKING: CANNOT ASSESS
ON A TYPICAL DAY WHEN YOU DRINK ALCOHOL HOW MANY DRINKS DO YOU HAVE: 3

## 2021-04-29 ASSESSMENT — PAIN DESCRIPTION - PAIN TYPE
TYPE: ACUTE PAIN
TYPE: ACUTE PAIN

## 2021-04-29 ASSESSMENT — PATIENT HEALTH QUESTIONNAIRE - PHQ9
3. TROUBLE FALLING OR STAYING ASLEEP OR SLEEPING TOO MUCH: NOT AT ALL
9. THOUGHTS THAT YOU WOULD BE BETTER OFF DEAD, OR OF HURTING YOURSELF: NOT AT ALL
5. POOR APPETITE OR OVEREATING: NOT AT ALL
4. FEELING TIRED OR HAVING LITTLE ENERGY: MORE THAN HALF THE DAYS
SUM OF ALL RESPONSES TO PHQ QUESTIONS 1-9: 3
8. MOVING OR SPEAKING SO SLOWLY THAT OTHER PEOPLE COULD HAVE NOTICED. OR THE OPPOSITE, BEING SO FIGETY OR RESTLESS THAT YOU HAVE BEEN MOVING AROUND A LOT MORE THAN USUAL: NOT AT ALL
1. LITTLE INTEREST OR PLEASURE IN DOING THINGS: NOT AT ALL
6. FEELING BAD ABOUT YOURSELF - OR THAT YOU ARE A FAILURE OR HAVE LET YOURSELF OR YOUR FAMILY DOWN: NOT AL ALL
7. TROUBLE CONCENTRATING ON THINGS, SUCH AS READING THE NEWSPAPER OR WATCHING TELEVISION: NOT AT ALL
2. FEELING DOWN, DEPRESSED, IRRITABLE, OR HOPELESS: SEVERAL DAYS
SUM OF ALL RESPONSES TO PHQ9 QUESTIONS 1 AND 2: 1

## 2021-04-29 ASSESSMENT — FIBROSIS 4 INDEX
FIB4 SCORE: 1.25
FIB4 SCORE: 1.29
FIB4 SCORE: 1.25

## 2021-04-29 NOTE — PROGRESS NOTES
Chief Complaint   Patient presents with   • Establish Care       Subjective:   Anel Schaeffer is a 71 y.o. female who presents today consultation for chest pain and palpitations.  She is a 71-year-old female who most recently was told that she had a cardiac murmur.  She has been profoundly fatigued and lacking energy.  She is also been complaining of chest pain and chest pressure with exertion which lasts 1 to 2 minutes and goes away with rest.  She has never had an echocardiogram.  She does have some high blood pressure hyperlipidemia she does not smoke drink or do drugs.  She has no premature family history disease.    Past Medical History:   Diagnosis Date   • Hypertension     off meds since 2012     Past Surgical History:   Procedure Laterality Date   • ABDOMINAL HYSTERECTOMY TOTAL      1989   • APPENDECTOMY      1966   • HUMERUS ORIF      3/2014  - right   • LIPOMA EXCISION      left leg   • SHOULDER ARTHROSCOPY      right - bone spurs.  [facility] - ortho   • TONSILLECTOMY      1956   • ULNA ORIF      left - 3/2014 -      Family History   Problem Relation Age of Onset   • Cancer Mother         throat  - smoker   • Other Father         My. Gravis   • Cancer Sister         ovarian - age 67   • Cancer Sister         melanoma     Social History     Socioeconomic History   • Marital status:      Spouse name: Not on file   • Number of children: Not on file   • Years of education: Not on file   • Highest education level: Not on file   Occupational History   • Not on file   Tobacco Use   • Smoking status: Never Smoker   • Smokeless tobacco: Never Used   Substance and Sexual Activity   • Alcohol use: Yes     Comment: socially    • Drug use: Yes     Frequency: 7.0 times per week     Types: Marijuana, Oral, Inhaled     Comment: for sleep    • Sexual activity: Yes     Birth control/protection: Surgical     Comment: ; two boys; wk:  [employer]   Other Topics Concern   • Not on file    Social History Narrative   • Not on file     Social Determinants of Health     Financial Resource Strain:    • Difficulty of Paying Living Expenses:    Food Insecurity:    • Worried About Running Out of Food in the Last Year:    • Ran Out of Food in the Last Year:    Transportation Needs:    • Lack of Transportation (Medical):    • Lack of Transportation (Non-Medical):    Physical Activity:    • Days of Exercise per Week:    • Minutes of Exercise per Session:    Stress:    • Feeling of Stress :    Social Connections:    • Frequency of Communication with Friends and Family:    • Frequency of Social Gatherings with Friends and Family:    • Attends Holiness Services:    • Active Member of Clubs or Organizations:    • Attends Club or Organization Meetings:    • Marital Status:    Intimate Partner Violence:    • Fear of Current or Ex-Partner:    • Emotionally Abused:    • Physically Abused:    • Sexually Abused:      No Known Allergies  Outpatient Encounter Medications as of 4/29/2021   Medication Sig Dispense Refill   • losartan (COZAAR) 50 MG Tab Take 50 mg by mouth every day.     • atorvastatin (LIPITOR) 10 MG Tab Take 10 mg by mouth.     • Cetirizine HCl (ZYRTEC ALLERGY) 10 MG Cap Take  by mouth every day.     • aspirin (ASA) 81 MG Chew Tab chewable tablet Chew 81 mg every evening.     • [DISCONTINUED] azithromycin (ZITHROMAX) 250 MG Tab      • [DISCONTINUED] oseltamivir (TAMIFLU) 75 MG Cap      • [DISCONTINUED] lisinopril (PRINIVIL) 10 MG Tab Take 10 mg by mouth every day.     • [DISCONTINUED] maalox plus-benadryl-visc lidocaine (MAGIC MOUTHWASH) Take 5 mL by mouth every 6 hours as needed. (Patient not taking: Reported on 4/29/2021) 100 mL 0   • [DISCONTINUED] fluconazole (DIFLUCAN) 150 MG tablet Take 1 Tab by mouth every day. (Patient not taking: Reported on 4/29/2021) 1 Tab 0     No facility-administered encounter medications on file as of 4/29/2021.     Review of Systems   Constitutional: Negative.  Negative  "for chills, fever and malaise/fatigue.   HENT: Negative.  Negative for sore throat.    Eyes: Negative.    Respiratory: Positive for shortness of breath. Negative for cough, hemoptysis, sputum production, wheezing and stridor.    Cardiovascular: Positive for chest pain. Negative for palpitations, orthopnea, claudication, leg swelling and PND.   Gastrointestinal: Negative.    Genitourinary: Negative.    Musculoskeletal: Negative.    Skin: Negative.    Neurological: Negative.  Negative for dizziness, loss of consciousness and weakness.   Endo/Heme/Allergies: Negative.  Does not bruise/bleed easily.   All other systems reviewed and are negative.       Objective:   /88 (BP Location: Left arm, Patient Position: Sitting, BP Cuff Size: Adult)   Pulse 94   Ht 1.651 m (5' 5\")   Wt 121 kg (266 lb)   SpO2 93%   BMI 44.26 kg/m²     Physical Exam   Constitutional: She appears well-developed and well-nourished. No distress.   HENT:   Head: Normocephalic and atraumatic.   Right Ear: External ear normal.   Left Ear: External ear normal.   Nose: Nose normal.   Mouth/Throat: No oropharyngeal exudate.   Eyes: Pupils are equal, round, and reactive to light. Conjunctivae and EOM are normal. Right eye exhibits no discharge. Left eye exhibits no discharge. No scleral icterus.   Neck: No JVD present.   Cardiovascular: Normal rate, regular rhythm and intact distal pulses. Exam reveals no gallop and no friction rub.   Murmur heard.  Absent A2   Pulmonary/Chest: Effort normal. No stridor. No respiratory distress. She has no wheezes. She has no rales. She exhibits no tenderness.   Abdominal: Soft. She exhibits no distension. There is no guarding.   Musculoskeletal:         General: No tenderness, deformity or edema. Normal range of motion.      Cervical back: Neck supple.   Neurological: She is alert. She has normal reflexes. She displays normal reflexes. No cranial nerve deficit. She exhibits normal muscle tone. Coordination normal. "   Skin: Skin is warm and dry. No rash noted. She is not diaphoretic. No erythema. No pallor.   Psychiatric: She has a normal mood and affect. Her behavior is normal. Judgment and thought content normal.   Nursing note and vitals reviewed.      Assessment:     1. Palpitations  EKG   2. H/O cardiac arrhythmia     3. SVT (supraventricular tachycardia) (HCC)     4. Unstable angina pectoris (HCC)     5. Mixed hyperlipidemia     6. Essential hypertension     7. Undiagnosed cardiac murmurs         Medical Decision Making:  Today's Assessment / Status / Plan:   71-year-old female with a murmur concerning for moderate to severe aortic stenosis.  I discussed with her the work-up as an outpatient given her current chest pain which could include stress testing and waiting.  I am concerned about unstable angina as well.  We will have her go to the ER for her chest pain.  She needs a stat echocardiogram.  She will be n.p.o. after midnight for cardiac catheterization in the morning.

## 2021-04-29 NOTE — ED TRIAGE NOTES
Pt amb to triage after EKG.  Chief Complaint   Patient presents with   • Chest Pressure   • Rapid Heart Beat     intermittent x1mo     Pt reports she gets clammy during episodes.

## 2021-04-30 ENCOUNTER — APPOINTMENT (OUTPATIENT)
Dept: CARDIOLOGY | Facility: MEDICAL CENTER | Age: 71
DRG: 287 | End: 2021-04-30
Attending: INTERNAL MEDICINE
Payer: MEDICARE

## 2021-04-30 ENCOUNTER — APPOINTMENT (OUTPATIENT)
Dept: CARDIOLOGY | Facility: MEDICAL CENTER | Age: 71
DRG: 287 | End: 2021-04-30
Attending: NURSE PRACTITIONER
Payer: MEDICARE

## 2021-04-30 PROBLEM — E78.5 DYSLIPIDEMIA: Chronic | Status: ACTIVE | Noted: 2021-04-29

## 2021-04-30 PROBLEM — I20.0 UNSTABLE ANGINA (HCC): Status: ACTIVE | Noted: 2021-04-29

## 2021-04-30 PROBLEM — F12.90 MARIJUANA USE, CONTINUOUS: Chronic | Status: ACTIVE | Noted: 2021-04-30

## 2021-04-30 PROBLEM — E87.6 HYPOKALEMIA: Status: ACTIVE | Noted: 2021-04-30

## 2021-04-30 PROBLEM — J30.2 SEASONAL ALLERGIES: Chronic | Status: ACTIVE | Noted: 2021-04-30

## 2021-04-30 LAB
ALBUMIN SERPL BCP-MCNC: 3.9 G/DL (ref 3.2–4.9)
ALBUMIN/GLOB SERPL: 1.2 G/DL
ALP SERPL-CCNC: 82 U/L (ref 30–99)
ALT SERPL-CCNC: 13 U/L (ref 2–50)
ANION GAP SERPL CALC-SCNC: 8 MMOL/L (ref 7–16)
AST SERPL-CCNC: 15 U/L (ref 12–45)
BASOPHILS # BLD AUTO: 0.4 % (ref 0–1.8)
BASOPHILS # BLD: 0.03 K/UL (ref 0–0.12)
BILIRUB SERPL-MCNC: 0.4 MG/DL (ref 0.1–1.5)
BUN SERPL-MCNC: 15 MG/DL (ref 8–22)
CALCIUM SERPL-MCNC: 8.9 MG/DL (ref 8.5–10.5)
CHLORIDE SERPL-SCNC: 104 MMOL/L (ref 96–112)
CO2 SERPL-SCNC: 26 MMOL/L (ref 20–33)
CREAT SERPL-MCNC: 0.52 MG/DL (ref 0.5–1.4)
EKG IMPRESSION: NORMAL
EOSINOPHIL # BLD AUTO: 0.3 K/UL (ref 0–0.51)
EOSINOPHIL NFR BLD: 3.5 % (ref 0–6.9)
ERYTHROCYTE [DISTWIDTH] IN BLOOD BY AUTOMATED COUNT: 41 FL (ref 35.9–50)
EST. AVERAGE GLUCOSE BLD GHB EST-MCNC: 120 MG/DL
GLOBULIN SER CALC-MCNC: 3.3 G/DL (ref 1.9–3.5)
GLUCOSE SERPL-MCNC: 104 MG/DL (ref 65–99)
HBA1C MFR BLD: 5.8 % (ref 4–5.6)
HCT VFR BLD AUTO: 42.4 % (ref 37–47)
HGB BLD-MCNC: 14.5 G/DL (ref 12–16)
IMM GRANULOCYTES # BLD AUTO: 0.02 K/UL (ref 0–0.11)
IMM GRANULOCYTES NFR BLD AUTO: 0.2 % (ref 0–0.9)
LYMPHOCYTES # BLD AUTO: 2.68 K/UL (ref 1–4.8)
LYMPHOCYTES NFR BLD: 31.3 % (ref 22–41)
MCH RBC QN AUTO: 31 PG (ref 27–33)
MCHC RBC AUTO-ENTMCNC: 34.2 G/DL (ref 33.6–35)
MCV RBC AUTO: 90.8 FL (ref 81.4–97.8)
MONOCYTES # BLD AUTO: 0.59 K/UL (ref 0–0.85)
MONOCYTES NFR BLD AUTO: 6.9 % (ref 0–13.4)
NEUTROPHILS # BLD AUTO: 4.95 K/UL (ref 2–7.15)
NEUTROPHILS NFR BLD: 57.7 % (ref 44–72)
NRBC # BLD AUTO: 0 K/UL
NRBC BLD-RTO: 0 /100 WBC
PLATELET # BLD AUTO: 238 K/UL (ref 164–446)
PMV BLD AUTO: 11.3 FL (ref 9–12.9)
POTASSIUM SERPL-SCNC: 3.7 MMOL/L (ref 3.6–5.5)
PROT SERPL-MCNC: 7.2 G/DL (ref 6–8.2)
RBC # BLD AUTO: 4.67 M/UL (ref 4.2–5.4)
SODIUM SERPL-SCNC: 138 MMOL/L (ref 135–145)
TROPONIN T SERPL-MCNC: 7 NG/L (ref 6–19)
WBC # BLD AUTO: 8.6 K/UL (ref 4.8–10.8)

## 2021-04-30 PROCEDURE — 770020 HCHG ROOM/CARE - TELE (206)

## 2021-04-30 PROCEDURE — 99231 SBSQ HOSP IP/OBS SF/LOW 25: CPT | Mod: GC | Performed by: INTERNAL MEDICINE

## 2021-04-30 PROCEDURE — A9270 NON-COVERED ITEM OR SERVICE: HCPCS | Performed by: STUDENT IN AN ORGANIZED HEALTH CARE EDUCATION/TRAINING PROGRAM

## 2021-04-30 PROCEDURE — A9270 NON-COVERED ITEM OR SERVICE: HCPCS | Performed by: INTERNAL MEDICINE

## 2021-04-30 PROCEDURE — 700102 HCHG RX REV CODE 250 W/ 637 OVERRIDE(OP): Performed by: INTERNAL MEDICINE

## 2021-04-30 PROCEDURE — 700102 HCHG RX REV CODE 250 W/ 637 OVERRIDE(OP): Performed by: STUDENT IN AN ORGANIZED HEALTH CARE EDUCATION/TRAINING PROGRAM

## 2021-04-30 PROCEDURE — 80053 COMPREHEN METABOLIC PANEL: CPT

## 2021-04-30 PROCEDURE — 85025 COMPLETE CBC W/AUTO DIFF WBC: CPT

## 2021-04-30 PROCEDURE — 84484 ASSAY OF TROPONIN QUANT: CPT

## 2021-04-30 PROCEDURE — 700105 HCHG RX REV CODE 258: Performed by: NURSE PRACTITIONER

## 2021-04-30 PROCEDURE — 93005 ELECTROCARDIOGRAM TRACING: CPT | Performed by: STUDENT IN AN ORGANIZED HEALTH CARE EDUCATION/TRAINING PROGRAM

## 2021-04-30 PROCEDURE — 83036 HEMOGLOBIN GLYCOSYLATED A1C: CPT

## 2021-04-30 RX ORDER — QUETIAPINE FUMARATE 25 MG/1
25 TABLET, FILM COATED ORAL NIGHTLY PRN
Status: DISCONTINUED | OUTPATIENT
Start: 2021-04-30 | End: 2021-05-01 | Stop reason: HOSPADM

## 2021-04-30 RX ORDER — SODIUM CHLORIDE 9 MG/ML
INJECTION, SOLUTION INTRAVENOUS CONTINUOUS
Status: DISCONTINUED | OUTPATIENT
Start: 2021-04-30 | End: 2021-05-01 | Stop reason: HOSPADM

## 2021-04-30 RX ORDER — CHOLECALCIFEROL (VITAMIN D3) 125 MCG
5 CAPSULE ORAL NIGHTLY
Status: DISCONTINUED | OUTPATIENT
Start: 2021-04-30 | End: 2021-05-01 | Stop reason: HOSPADM

## 2021-04-30 RX ORDER — POTASSIUM CHLORIDE 20 MEQ/1
40 TABLET, EXTENDED RELEASE ORAL ONCE
Status: COMPLETED | OUTPATIENT
Start: 2021-04-30 | End: 2021-04-30

## 2021-04-30 RX ADMIN — ASPIRIN 81 MG: 81 TABLET, CHEWABLE ORAL at 16:47

## 2021-04-30 RX ADMIN — NITROGLYCERIN 0.5 INCH: 20 OINTMENT TOPICAL at 04:03

## 2021-04-30 RX ADMIN — ACETAMINOPHEN 650 MG: 325 TABLET, FILM COATED ORAL at 13:05

## 2021-04-30 RX ADMIN — NITROGLYCERIN 0.4 MG: 0.4 TABLET, ORALLY DISINTEGRATING SUBLINGUAL at 16:47

## 2021-04-30 RX ADMIN — SODIUM CHLORIDE: 9 INJECTION, SOLUTION INTRAVENOUS at 09:42

## 2021-04-30 RX ADMIN — Medication 5 MG: at 00:51

## 2021-04-30 RX ADMIN — CETIRIZINE HYDROCHLORIDE 10 MG: 10 TABLET, FILM COATED ORAL at 05:17

## 2021-04-30 RX ADMIN — Medication 1000 UNITS: at 05:17

## 2021-04-30 RX ADMIN — DOCUSATE SODIUM 50 MG AND SENNOSIDES 8.6 MG 2 TABLET: 8.6; 5 TABLET, FILM COATED ORAL at 05:17

## 2021-04-30 RX ADMIN — ATORVASTATIN CALCIUM 10 MG: 10 TABLET, FILM COATED ORAL at 20:54

## 2021-04-30 RX ADMIN — LORAZEPAM 0.5 MG: 0.5 TABLET ORAL at 04:07

## 2021-04-30 RX ADMIN — LOSARTAN POTASSIUM 50 MG: 50 TABLET, FILM COATED ORAL at 05:17

## 2021-04-30 RX ADMIN — POTASSIUM CHLORIDE 40 MEQ: 1500 TABLET, EXTENDED RELEASE ORAL at 09:42

## 2021-04-30 RX ADMIN — ACETAMINOPHEN 650 MG: 325 TABLET, FILM COATED ORAL at 20:54

## 2021-04-30 RX ADMIN — QUETIAPINE FUMARATE 25 MG: 25 TABLET ORAL at 22:52

## 2021-04-30 RX ADMIN — OXYCODONE HYDROCHLORIDE AND ACETAMINOPHEN 1000 MG: 500 TABLET ORAL at 05:17

## 2021-04-30 ASSESSMENT — ENCOUNTER SYMPTOMS
VOMITING: 0
SORE THROAT: 0
DOUBLE VISION: 0
WHEEZING: 0
CHILLS: 0
CONSTIPATION: 0
MYALGIAS: 0
PALPITATIONS: 0
FEVER: 0
DIARRHEA: 0
NAUSEA: 0
NERVOUS/ANXIOUS: 0
HEADACHES: 0
FALLS: 0
SHORTNESS OF BREATH: 0
BLURRED VISION: 0
SPEECH CHANGE: 0
SEIZURES: 0
DIZZINESS: 0
ABDOMINAL PAIN: 0
COUGH: 0

## 2021-04-30 ASSESSMENT — FIBROSIS 4 INDEX: FIB4 SCORE: 1.24

## 2021-04-30 ASSESSMENT — PAIN DESCRIPTION - PAIN TYPE
TYPE: ACUTE PAIN
TYPE: ACUTE PAIN

## 2021-04-30 ASSESSMENT — LIFESTYLE VARIABLES: SUBSTANCE_ABUSE: 0

## 2021-04-30 NOTE — ASSESSMENT & PLAN NOTE
Lipid panel showed cholesterol was normal; HDL 65, LDL 81, total cholesterol 159.  This is within range for her.  Resume home atorvastatin  If patient test positive for diabetes, goal of LDL less than 70 per guidelines  May need to increase atorvastatin dose depending on findings from heart cath

## 2021-04-30 NOTE — H&P
Hospital Medicine History & Physical Note    Date of Service  4/29/2021    Primary Care Physician  GABRIELLE Gates    Consultants  Cardiology    Code Status  Full Code    Chief Complaint  Chief Complaint   Patient presents with   • Chest Pressure   • Rapid Heart Beat     intermittent x1mo       History of Presenting Illness  71 y.o. female with past medical history of hypertension, dyslipidemia, SVT, who presented 4/29/2021 with planes of intermittent chest pain and palpitation.  It happens on and off in the last month, last 1 to 2 minutes, typically with exertion.  She might feel lightheaded when she has episodes of palpitation.  Currently she denies chest pain.  EKG negative for ischemic changes.  Troponin is not elevated.  She denies  Patient was referred to emergency department by cardiologist Dr. Sanchez.  There is a concern for possible severe aortic stenosis, as well as coronary artery disease.  Plan for transthoracic echo and left heart catheterization in a.m.  Review of Systems  Review of Systems   Constitutional: Negative for chills, fever and weight loss.   HENT: Negative for ear pain, hearing loss and tinnitus.    Eyes: Negative for blurred vision, double vision and photophobia.   Respiratory: Negative for cough, hemoptysis and sputum production.    Cardiovascular: Positive for chest pain and palpitations. Negative for orthopnea.   Gastrointestinal: Negative for heartburn, nausea and vomiting.   Genitourinary: Negative for dysuria, flank pain, frequency and hematuria.   Musculoskeletal: Positive for joint pain. Negative for back pain and neck pain.   Skin: Negative for itching and rash.   Neurological: Positive for dizziness. Negative for tremors, speech change, focal weakness and headaches.   Endo/Heme/Allergies: Negative for environmental allergies and polydipsia. Does not bruise/bleed easily.   Psychiatric/Behavioral: Negative for hallucinations and substance abuse. The patient is not  nervous/anxious.        Past Medical History   has a past medical history of Hypertension.    Surgical History   has a past surgical history that includes humerus orif; ulna orif; abdominal hysterectomy total; appendectomy; tonsillectomy; lipoma excision; and shoulder arthroscopy.     Family History  family history includes Cancer in her mother, sister, and sister; Other in her father.     Social History   reports that she has never smoked. She has never used smokeless tobacco. She reports current alcohol use. She reports current drug use. Frequency: 7.00 times per week. Drugs: Marijuana, Oral, and Inhaled.    Allergies  No Known Allergies    Medications  Prior to Admission Medications   Prescriptions Last Dose Informant Patient Reported? Taking?   AMOXICILLIN PO 4/27/2021 at unknown  Yes Yes   Sig: Take 1 Each by mouth.   Cholecalciferol (VITAMIN D3 PO) 4/29/2021 at 0900 Patient Yes Yes   Sig: Take 1 Each by mouth every day.   ZINC SULFATE PO 4/29/2021 at 0900 Patient Yes Yes   Sig: Take 220 mg by mouth every day.   ascorbic acid (VITAMIN C) 500 MG tablet 4/29/2021 at 0900 Patient Yes Yes   Sig: Take 1,000 mg by mouth every day.   aspirin (ASA) 81 MG Chew Tab chewable tablet 4/28/2021 at 2100 Patient Yes No   Sig: Chew 81 mg every evening.   atorvastatin (LIPITOR) 10 MG Tab 4/28/2021 at 2100 Patient Yes No   Sig: Take 10 mg by mouth.   cetirizine (ZYRTEC ALLERGY) 10 MG Tab 4/28/2021 at 2100 Patient Yes No   Sig: Take 10 mg by mouth every day.   losartan (COZAAR) 50 MG Tab 4/29/2021 at 0900 Patient Yes No   Sig: Take 50 mg by mouth every day.      Facility-Administered Medications: None       Physical Exam  Temp:  [37.2 °C (98.9 °F)] 37.2 °C (98.9 °F)  Pulse:  [89-99] 89  Resp:  [17-27] 18  BP: (167-180)/(76-88) 167/76  SpO2:  [93 %-96 %] 93 %    Physical Exam  Vitals and nursing note reviewed.   Constitutional:       General: She is not in acute distress.     Appearance: Normal appearance. She is obese.   HENT:       Head: Normocephalic and atraumatic.      Nose: Nose normal.      Mouth/Throat:      Mouth: Mucous membranes are moist.   Eyes:      Extraocular Movements: Extraocular movements intact.      Pupils: Pupils are equal, round, and reactive to light.   Cardiovascular:      Rate and Rhythm: Normal rate and regular rhythm.      Heart sounds: Murmur present. Systolic murmur present.   Pulmonary:      Effort: Pulmonary effort is normal.      Breath sounds: Normal breath sounds.   Abdominal:      General: Abdomen is flat. There is no distension.      Tenderness: There is no abdominal tenderness.   Musculoskeletal:         General: No swelling or deformity. Normal range of motion.      Cervical back: Normal range of motion and neck supple.   Skin:     General: Skin is warm and dry.   Neurological:      General: No focal deficit present.      Mental Status: She is alert and oriented to person, place, and time.   Psychiatric:         Mood and Affect: Mood is anxious.         Behavior: Behavior normal.         Laboratory:  Recent Labs     04/29/21  1707   WBC 9.7   RBC 5.11   HEMOGLOBIN 14.6   HEMATOCRIT 45.8   MCV 89.6   MCH 28.6   MCHC 31.9*   RDW 40.5   PLATELETCT 260   MPV 11.4     Recent Labs     04/29/21  1707   SODIUM 138   POTASSIUM 4.0   CHLORIDE 102   CO2 25   GLUCOSE 100*   BUN 20   CREATININE 0.61   CALCIUM 9.5     Recent Labs     04/29/21  1707   ALTSGPT 18   ASTSGOT 20   ALKPHOSPHAT 97   TBILIRUBIN 0.3   GLUCOSE 100*         No results for input(s): NTPROBNP in the last 72 hours.      Recent Labs     04/29/21  1707   TROPONINT 6       Imaging:  DX-CHEST-PORTABLE (1 VIEW)   Final Result      1.  There is no acute cardiopulmonary process.      EC-ECHOCARDIOGRAM COMPLETE W/ CONT               Assessment/Plan:  I anticipate this patient is appropriate for observation status at this time.    Pain in the chest  Assessment & Plan   currently denies chest pain.  Troponin is not elevated.  EKG showed no acute ischemic  changes.  Heart catheterization pending to evaluate for CAD, n.p.o. at midnight  Nitroglycerin as needed for chest pain  Repeat troponin, monitor on telemetry    Anxiety  Assessment & Plan  P.o. lorazepam as needed    Systolic murmur  Assessment & Plan  Follow with transthoracic echo result.  Currently does not appear to be in heart failure    Essential hypertension- (present on admission)  Assessment & Plan  Not well controlled.  Resume losartan  IV labetalol as needed    Mixed hyperlipidemia- (present on admission)  Assessment & Plan  Resume Lipitor, repeat lipid panel    Palpitations- (present on admission)  Assessment & Plan  Monitor on telemetry  Follow-up with transthoracic echo results

## 2021-04-30 NOTE — ED NOTES
Med Rec completed: per patient at bedside    Preferred [facility] P: [phone]      Allergies:  No Known Allergies     Pt reports having been on Amoxicillin for a sinus infx until 2 days ago (4/27/21)

## 2021-04-30 NOTE — PROGRESS NOTES
Daily Progress Note:     Date of Service: 4/30/2021  Primary Team: [facility] Team   Attending: Jeffry Zarco M.D.   Senior Resident: Dr. Micah MD  Intern: Dr. Chetna Ontiveros M.D.  Contact:  [phone]    Chief Complaint:   Chief Complaint   Patient presents with   • Chest Pressure   • Rapid Heart Beat     intermittent x1mo         Subjective:   Pt stated overnight she did not get much sleep due to people frequently getting vitals and blood draws. Denies chest pain (improved with nitro), shortness of breath, and palpitations. States she is hungry since she has not eaten since yesterday at 7 AM.    Consultants/Specialty:  Cardiology    Review of Systems:   Review of Systems   Constitutional: Positive for malaise/fatigue. Negative for chills and fever.   HENT: Negative for congestion and sore throat.    Eyes: Negative for blurred vision and double vision.   Respiratory: Negative for cough, shortness of breath and wheezing.    Cardiovascular: Negative for chest pain, palpitations and leg swelling.   Gastrointestinal: Negative for abdominal pain, constipation, diarrhea, nausea and vomiting.   Genitourinary: Negative for dysuria, frequency and urgency.   Musculoskeletal: Positive for joint pain. Negative for falls and myalgias.   Neurological: Negative for dizziness, speech change, seizures and headaches.   Psychiatric/Behavioral: Negative for substance abuse and suicidal ideas. The patient is not nervous/anxious.        Objective Data:   Physical Exam:   Vitals:   Temp:  [36.4 °C (97.5 °F)-37.2 °C (98.9 °F)] 36.5 °C (97.7 °F)  Pulse:  [77-99] 83  Resp:  [16-27] 17  BP: (106-180)/(53-88) 127/63  SpO2:  [89 %-96 %] 89 %     Physical Exam  Vitals and nursing note reviewed.   Constitutional:       General: She is not in acute distress.     Appearance: Normal appearance. She is obese. She is not ill-appearing.   HENT:      Head: Normocephalic and atraumatic.      Right Ear: External ear normal.      Left Ear:  External ear normal.      Nose: Nose normal. No congestion or rhinorrhea.      Mouth/Throat:      Mouth: Mucous membranes are dry.      Pharynx: Oropharynx is clear. No oropharyngeal exudate or posterior oropharyngeal erythema.   Eyes:      General: No scleral icterus.        Right eye: No discharge.         Left eye: No discharge.      Extraocular Movements: Extraocular movements intact.      Conjunctiva/sclera: Conjunctivae normal.      Pupils: Pupils are equal, round, and reactive to light.      Comments: Wearing glasses   Cardiovascular:      Rate and Rhythm: Normal rate and regular rhythm.      Pulses: Normal pulses.      Heart sounds: Murmur present. No gallop.       Comments: 3/6 systolic murmur in right third intercostal space  Pulmonary:      Effort: Pulmonary effort is normal. No respiratory distress.      Breath sounds: No wheezing, rhonchi or rales.   Abdominal:      General: Abdomen is flat. Bowel sounds are normal.      Palpations: Abdomen is soft.      Tenderness: There is no abdominal tenderness. There is no right CVA tenderness, left CVA tenderness or guarding.   Musculoskeletal:         General: No swelling or tenderness. Normal range of motion.      Cervical back: Normal range of motion and neck supple. No rigidity or tenderness.      Right lower leg: No edema.      Left lower leg: No edema.   Skin:     General: Skin is warm and dry.      Capillary Refill: Capillary refill takes less than 2 seconds.      Coloration: Skin is not jaundiced.      Findings: No rash.   Neurological:      General: No focal deficit present.      Mental Status: She is alert and oriented to person, place, and time.      Cranial Nerves: No cranial nerve deficit.      Sensory: No sensory deficit.      Motor: No weakness.   Psychiatric:         Mood and Affect: Mood normal.         Behavior: Behavior normal.         Thought Content: Thought content normal.         Judgment: Judgment normal.           Labs:   Recent Labs      04/29/21 1707 04/30/21 0217   WBC 9.7 8.6   RBC 5.11 4.67   HEMOGLOBIN 14.6 14.5   HEMATOCRIT 45.8 42.4   MCV 89.6 90.8   MCH 28.6 31.0   RDW 40.5 41.0   PLATELETCT 260 238   MPV 11.4 11.3   NEUTSPOLYS 66.10 57.70   LYMPHOCYTES 26.60 31.30   MONOCYTES 5.10 6.90   EOSINOPHILS 1.60 3.50   BASOPHILS 0.30 0.40     Recent Labs     04/29/21 1707 04/30/21 0217   SODIUM 138 138   POTASSIUM 4.0 3.7   CHLORIDE 102 104   CO2 25 26   GLUCOSE 100* 104*   BUN 20 15        Imaging:   DX-CHEST-PORTABLE (1 VIEW)   Final Result      1.  There is no acute cardiopulmonary process.      EC-ECHOCARDIOGRAM COMPLETE W/ CONT   Final Result      CL-LEFT HEART CATHETERIZATION WITH POSSIBLE INTERVENTION    (Results Pending)       Problem Representation:   71-year-old female with a past medical history significant for dyslipidemia, hypertension, aortic stenosis, and history of supraventricular tachycardia who presented with chest pressure in 1 to 2 months of intermittent palpitations worsened with exertion.    * Unstable angina (HCC)- (present on admission)  Assessment & Plan  Presented directly from Dr. Sanchez's office for this; chest pain worsened with exertion. Pt currently denies chest pain.  Troponin negative x2.  EKG showed no acute ischemic changes.  Patient states that she has never been evaluated by cardiology before; Dr. Sanchez appointment on 4/29/2021 was her first of her cardiology appointment.    Heart catheterization pending to evaluate for CAD, also to look at anatomy of the heart, patient is currently n.p.o.  Nitroglycerin as needed for chest pain  Continue to monitor on telemetry    Systolic murmur- (present on admission)  Assessment & Plan  Follow with transthoracic echo result.  Currently does not appear to be in heart failure    Essential hypertension- (present on admission)  Assessment & Plan  Not well controlled.  Resume losartan 50 mg daily  Consider adding additional blood pressure medications as needed  IV labetalol  as needed    Dyslipidemia- (present on admission)  Assessment & Plan  Lipid panel showed cholesterol was normal; HDL 65, LDL 81, total cholesterol 159.  This is within range for her.  Resume home atorvastatin  If patient test positive for diabetes, goal of LDL less than 70 per guidelines  May need to increase atorvastatin dose depending on findings from heart cath    Palpitations- (present on admission)  Assessment & Plan  Patient has had 1-2 months of this intermittently.  Monitor on telemetry  Follow-up with transthoracic echo results    Marijuana use, continuous- (present on admission)  Assessment & Plan  Encourage marijuana cessation  Watch out for cannabinoid hyperemesis syndrome    Hypokalemia- (present on admission)  Assessment & Plan  Replete with goal of K of 4    Seasonal allergies- (present on admission)  Assessment & Plan  Continue home cetirizine    Anxiety- (present on admission)  Assessment & Plan  Melatonin nightly  As needed Seroquel 25 mg nightly, can also add trazodone if needed  Avoid benzodiazepines if possible      Quality Measures:  Code: Full  VTE: Lovenox  Diet: NPO  Disposition: Remain inpatient    Please note that this dictation was created using voice recognition software. I have worked with technical experts from [employer] to optimize the interface.  I have made every reasonable attempt to correct obvious errors, but there may be errors of grammar and possibly content that I did not discover before finalizing the note.

## 2021-04-30 NOTE — CARE PLAN
Problem: Venous Thromboembolism (VTW)/Deep Vein Thrombosis (DVT) Prevention:  Goal: Patient will participate in Venous Thrombosis (VTE)/Deep Vein Thrombosis (DVT)Prevention Measures  Outcome: PROGRESSING AS EXPECTED     Problem: Pain Management  Goal: Pain level will decrease to patient's comfort goal  Outcome: PROGRESSING AS EXPECTED     Pt educated on reason for DVT prophylaxis and ambulation as tolerated. Pt c/o pain 0/10 and stated rest helps.

## 2021-04-30 NOTE — PROGRESS NOTES
Cardiology Follow Up Progress Note    Date of Service  4/30/2021    Attending Physician  Jeffry Zarco M.D.    PMH: Hypertension, hyperlipidemia, reportedly cardiac murmur concerning for moderate to severe aortic stenosis, palpitations, history of SVT      Interim Events    No overnight cardiac events  No arrhythmia  Underwent coronary angiography this morning, no angiographic evidence of CAD.  Plan for discharge this afternoon  Outpatient cardiac monitoring, will arrange  Referral for sleep study, will arrange.      Review of Systems  Review of Systems   Respiratory: Negative for apnea, cough, choking, chest tightness, shortness of breath, wheezing and stridor.        Vital signs in last 24 hours  Temp:  [36.4 °C (97.5 °F)-37.2 °C (98.9 °F)] 36.5 °C (97.7 °F)  Pulse:  [77-99] 83  Resp:  [16-27] 17  BP: (106-180)/(53-88) 127/63  SpO2:  [89 %-96 %] 89 %    Physical Exam  Physical Exam  Cardiovascular:      Rate and Rhythm: Normal rate and regular rhythm.      Pulses: Normal pulses.   Skin:     General: Skin is warm.      Comments: Right radial TR band intact   Neurological:      General: No focal deficit present.      Mental Status: She is alert.   Psychiatric:         Mood and Affect: Mood normal.         Lab Review  Lab Results   Component Value Date/Time    WBC 8.6 04/30/2021 02:17 AM    RBC 4.67 04/30/2021 02:17 AM    HEMOGLOBIN 14.5 04/30/2021 02:17 AM    HEMATOCRIT 42.4 04/30/2021 02:17 AM    MCV 90.8 04/30/2021 02:17 AM    MCH 31.0 04/30/2021 02:17 AM    MCHC 34.2 04/30/2021 02:17 AM    MPV 11.3 04/30/2021 02:17 AM      Lab Results   Component Value Date/Time    SODIUM 138 04/30/2021 02:17 AM    POTASSIUM 3.7 04/30/2021 02:17 AM    CHLORIDE 104 04/30/2021 02:17 AM    CO2 26 04/30/2021 02:17 AM    GLUCOSE 104 (H) 04/30/2021 02:17 AM    BUN 15 04/30/2021 02:17 AM    CREATININE 0.52 04/30/2021 02:17 AM      Lab Results   Component Value Date/Time    ASTSGOT 15 04/30/2021 02:17 AM    ALTSGPT 13 04/30/2021  02:17 AM     Lab Results   Component Value Date/Time    CHOLSTRLTOT 159 04/29/2021 05:07 PM    LDL 81 04/29/2021 05:07 PM    HDL 65 04/29/2021 05:07 PM    TRIGLYCERIDE 64 04/29/2021 05:07 PM    TROPONINT 7 04/30/2021 02:17 AM       No results for input(s): NTPROBNP in the last 72 hours.    Cardiac Imaging and Procedures Review  EKG: Sinus rhythm    Echocardiogram:    4/29/2021  Normal left ventricular systolic function.  Left ventricular ejection fraction is visually estimated to be 70%.  Normal regional wall motion.  Contrast was used to enhance visualization of the endocardial border.  Normal right ventricular size and systolic function.  Mild mitral annular calcification.  No mitral regurgitation.  Moderate aortic stenosis.  Transvalvular gradients are - Peak: 36 mmHg, Mean: 26 mmHg.  Normal pericardium without effusion.  Normal aortic root for body surface area.          Cardiac Catheterization:      5/1/2021  No angiographic evidence of CAD  Normal LVEF 65%  Elevated LV end-diastolic pressure        Imaging  Chest X-Ray: No acute cardiopulmonary process    Stress Test: Not applicable    Assessment/Plan    Unstable angina  -s/p LHC 5/1/2021, no angiographic evidence of CAD  -Elevated LV end-diastolic pressure  -Add spironolactone    Moderate aortic stenosis  -Follow-up with our cardiology office as an outpatient    Hypertension  -/70  -Add spironolactone, uptitrate losartan    Hyperlipidemia  -LDL 81    Palpitations  -Outpatient cardiac monitoring, will arrange      Stable for discharge this afternoon  Plan for outpatient cardiac monitoring, will arrange  Referral to sleep study, will arrange    Thank you for allowing me to participate in the care of this patient.      Please contact me with any questions.    DIAMANTE Carias.   Cardiologist, [facility]  [phone]

## 2021-04-30 NOTE — NON-PROVIDER
This note is intended for the purposes of medical student education and feedback only.   Please refer to the documentation by this patient's assigned medical practitioner for details of care and plans.    Reason for admission: Patient is a 72 yo female with a PMH significant for HTN, HLD, Anxiety and SVT    HD/POD#: 1    SUBJECTIVE  No acute overnight events. Patient reports that she is not experiencing any chest pain or palpitations currently. She has been NPO prior to going to cath lab.    OBJECTIVE   Vital Signs:  • Max 24 hour temp:97.7  • Current temp:97.7  • Current HR:83  • Current BP:127/63  • Current RR:17  • Current O2 Sat: 89 on room air    Physical Exam:  General: pleasant elderly obese female lying in her hospital bed. Alert and oriented and in no acute distress.   HEENT: normocephalic. No gross deformities or neck masses present.  Cardiovascular: RRR, no rubs or gallops. Grade 2/6 systolic murmer appreciated best in the R. Upper sternal border.  Respiratory: CTAB  Extremities: No edema present in extremities.      Lab Results:  Recent Labs     04/29/21 1707 04/30/21 0217   WBC 9.7 8.6   RBC 5.11 4.67   HEMOGLOBIN 14.6 14.5   HEMATOCRIT 45.8 42.4   MCV 89.6 90.8   MCH 28.6 31.0   MCHC 31.9* 34.2   RDW 40.5 41.0   PLATELETCT 260 238   MPV 11.4 11.3     Recent Labs     04/29/21  1707 04/30/21 0217   SODIUM 138 138   POTASSIUM 4.0 3.7   CHLORIDE 102 104   CO2 25 26   GLUCOSE 100* 104*   BUN 20 15   CREATININE 0.61 0.52   CALCIUM 9.5 8.9         Recent Labs     04/29/21 1707 04/30/21 0217   ASTSGOT 20 15   ALTSGPT 18 13   TBILIRUBIN 0.3 0.4   ALKPHOSPHAT 97 82   GLOBULIN 3.9* 3.3       Imaging Results:  EKG showed no signs of ST elevations.  Echocardiogram revealed moderate aortic stenosis with an EF of 70%  CXR showed no signs of cardiopulmonary processes    ASSESSMENT/PLAN  Patient is a 72 yo female with a HTN, HLD, SVT anxiety being seen for CP and palpitations and her echo revealed moderate aortic  stenosis. The following problems are being addressed in order of severity:  1) Unstable angina - Presented directly from Dr. Sanchez's office for this; chest pain worsened with exertion. Pt currently denies chest pain.  Troponin negative x2.  EKG showed no acute ischemic changes.  Patient states that she has never been evaluated by cardiology before; Dr. Sanchez appointment on 4/29/2021 was her first of her cardiology appointment.  Heart catheterization pending to evaluate for CAD, also to look at anatomy of the heart, patient is currently n.p.o.  Nitroglycerin as needed for chest pain  Continue to monitor on telemetry  2) Palpitations - Patient has had 1-2 months of this intermittently.  Monitor on telemetry. Follow-up with transthoracic echo results  3) Essential HTN - Not well controlled. Resume losartan 50 mg daily. Consider adding additional blood pressure medications as needed  IV labetalol as needed  4) Dyslipidemia - Lipid panel showed cholesterol was normal; HDL 65, LDL 81, total cholesterol 159.  This is within range for her.  Resume home atorvastatin  If patient test positive for diabetes, goal of LDL less than 70 per guidelines  May need to increase atorvastatin dose depending on findings from heart cath  5) Anxiety - Melatonin nightly. As needed Seroquel 25 mg nightly, can also add trazodone if needed. Avoid benzodiazepines if possible  6) Hypokalemia - replete with KCl with a goal of 4  7) Continuous mariajuana use - watch for canniboid hyperemesis syndrome. Encourage cessation.      PROPHYLAXIS  • DVT:Lovenox  • GI:NPO

## 2021-04-30 NOTE — ED NOTES
Pt ambulated to room with this tech. Pt changed into gown. Chart up for erp.    PIV in place. Blood to lab.

## 2021-04-30 NOTE — CARE PLAN
Problem: Communication  Goal: The ability to communicate needs accurately and effectively will improve  Outcome: PROGRESSING AS EXPECTED  Note: Pt educated regarding plan of care, medication and cath procedure. All questions answered. Let pt know that if she thinks of any other questions to please feel free to ask. Will continue to make sure that pt's anxiety is controlled.        Problem: Safety  Goal: Will remain free from injury  Outcome: PROGRESSING AS EXPECTED  Note: Fall precautions in place. Bed in lowest position. Non-skid socks in place. Personal possessions within reach. Mobility sign on door. Bed-alarm on. Call light within reach.Let pt know that if she does feel dizzy, or lightheaded to not get up and to let me know.  Pt educated regarding fall prevention and states understanding.

## 2021-04-30 NOTE — ASSESSMENT & PLAN NOTE
Patient has had 1-2 months of this intermittently.  Monitor on telemetry  Follow-up with transthoracic echo results

## 2021-04-30 NOTE — ASSESSMENT & PLAN NOTE
Melatonin nightly  As needed Seroquel 25 mg nightly, can also add trazodone if needed  Avoid benzodiazepines if possible

## 2021-04-30 NOTE — ASSESSMENT & PLAN NOTE
Presented directly from Dr. Sanchez's office for this; chest pain worsened with exertion. Pt currently denies chest pain.  Troponin negative x2.  EKG showed no acute ischemic changes.  Patient states that she has never been evaluated by cardiology before; Dr. Sanchez appointment on 4/29/2021 was her first of her cardiology appointment.    Heart catheterization pending to evaluate for CAD, also to look at anatomy of the heart, patient is currently n.p.o.  Nitroglycerin as needed for chest pain  Continue to monitor on telemetry

## 2021-04-30 NOTE — ED PROVIDER NOTES
ED Provider Note    CHIEF COMPLAINT  Chief Complaint   Patient presents with   • Chest Pressure   • Rapid Heart Beat     intermittent x1mo       HPI  Anel Schaeffer is a 71 y.o. female who presents for evaluation of chest pressure, intermittent palpitations.  The patient was seen by Dr. Sanchez in the clinic today with the symptoms.  He was quite concerned given her symptomatology and wanted her admitted to the hospital.  He is ordered a stat echocardiogram and will plan on performing heart catheterization in the morning.  The patient does not have any known history of coronary artery disease.  She does have a history of SVT as well as angina hypertension and dyslipidemia.  The patient currently does not endorse chest pain.  There is also concern about possible severe aortic stenosis.    REVIEW OF SYSTEMS  See HPI for further details.  No high fevers chills night sweats weight loss all other systems are negative.     PAST MEDICAL HISTORY  Past Medical History:   Diagnosis Date   • Hypertension     off meds since 2012       FAMILY HISTORY  Noncontributory    SOCIAL HISTORY  Social History     Socioeconomic History   • Marital status:      Spouse name: Not on file   • Number of children: Not on file   • Years of education: Not on file   • Highest education level: Not on file   Occupational History   • Not on file   Tobacco Use   • Smoking status: Never Smoker   • Smokeless tobacco: Never Used   Substance and Sexual Activity   • Alcohol use: Yes     Comment: socially    • Drug use: Yes     Frequency: 7.0 times per week     Types: Marijuana, Oral, Inhaled     Comment: for sleep    • Sexual activity: Yes     Birth control/protection: Surgical     Comment: ; two boys; wk:  [employer]   Other Topics Concern   • Not on file   Social History Narrative   • Not on file     Social Determinants of Health     Financial Resource Strain:    • Difficulty of Paying Living Expenses:    Food Insecurity:    •  "Worried About Running Out of Food in the Last Year:    • Ran Out of Food in the Last Year:    Transportation Needs:    • Lack of Transportation (Medical):    • Lack of Transportation (Non-Medical):    Physical Activity:    • Days of Exercise per Week:    • Minutes of Exercise per Session:    Stress:    • Feeling of Stress :    Social Connections:    • Frequency of Communication with Friends and Family:    • Frequency of Social Gatherings with Friends and Family:    • Attends Gnosticism Services:    • Active Member of Clubs or Organizations:    • Attends Club or Organization Meetings:    • Marital Status:    Intimate Partner Violence:    • Fear of Current or Ex-Partner:    • Emotionally Abused:    • Physically Abused:    • Sexually Abused:        SURGICAL HISTORY  Past Surgical History:   Procedure Laterality Date   • ABDOMINAL HYSTERECTOMY TOTAL      1989   • APPENDECTOMY      1966   • HUMERUS ORIF      3/2014  - right   • LIPOMA EXCISION      left leg   • SHOULDER ARTHROSCOPY      right - bone spurs.  Lew Barton - ortho   • TONSILLECTOMY      1956   • ULNA ORIF      left - 3/2014 -        CURRENT MEDICATIONS  Home Medications     Reviewed by Freddy Gilman PhT (Pharmacy Tech) on 04/29/21 at 1716  Med List Status: Complete   Medication Last Dose Status   ascorbic acid (VITAMIN C) 500 MG tablet 4/29/2021 Active   aspirin (ASA) 81 MG Chew Tab chewable tablet 4/28/2021 Active   atorvastatin (LIPITOR) 10 MG Tab 4/28/2021 Active   cetirizine (ZYRTEC ALLERGY) 10 MG Tab 4/28/2021 Active   Cholecalciferol (VITAMIN D3 PO) 4/29/2021 Active   losartan (COZAAR) 50 MG Tab 4/29/2021 Active   ZINC SULFATE PO 4/29/2021 Active                ALLERGIES  No Known Allergies    PHYSICAL EXAM  VITAL SIGNS: BP (!) 180/88   Pulse 91   Temp 37.2 °C (98.9 °F) (Temporal)   Resp 17   Ht 1.651 m (5' 5\")   Wt 119 kg (262 lb 5.6 oz)   SpO2 93%   BMI 43.66 kg/m²       Constitutional: Elderly  HENT: Normocephalic, Atraumatic, " Bilateral external ears normal, Oropharynx moist, No oral exudates, Nose normal.   Eyes: PERRLA, EOMI, Conjunctiva normal, No discharge.   Neck: Normal range of motion, No tenderness, Supple, No stridor.   Cardiovascular: Mild tachycardia, Normal rhythm, No murmurs, No rubs, No gallops.   Thorax & Lungs: Normal breath sounds, No respiratory distress, No wheezing, No chest tenderness.   Abdomen: Bowel sounds normal, Soft, No tenderness, No masses, No pulsatile masses.   Skin: Warm, Dry, No erythema, No rash.   Back: No tenderness, No CVA tenderness.   Extremities: Intact distal pulses, No edema, No tenderness, No cyanosis, No clubbing.   Neurologic: Alert & oriented x 3, Normal motor function, Normal sensory function, No focal deficits noted.   Psychiatric: Anxious    EC-ECHOCARDIOGRAM COMPLETE W/ CONT         DX-CHEST-PORTABLE (1 VIEW)    (Results Pending)     Results for orders placed or performed during the hospital encounter of 04/29/21   CBC with Differential   Result Value Ref Range    WBC 9.7 4.8 - 10.8 K/uL    RBC 5.11 4.20 - 5.40 M/uL    Hemoglobin 14.6 12.0 - 16.0 g/dL    Hematocrit 45.8 37.0 - 47.0 %    MCV 89.6 81.4 - 97.8 fL    MCH 28.6 27.0 - 33.0 pg    MCHC 31.9 (L) 33.6 - 35.0 g/dL    RDW 40.5 35.9 - 50.0 fL    Platelet Count 260 164 - 446 K/uL    MPV 11.4 9.0 - 12.9 fL    Neutrophils-Polys 66.10 44.00 - 72.00 %    Lymphocytes 26.60 22.00 - 41.00 %    Monocytes 5.10 0.00 - 13.40 %    Eosinophils 1.60 0.00 - 6.90 %    Basophils 0.30 0.00 - 1.80 %    Immature Granulocytes 0.30 0.00 - 0.90 %    Nucleated RBC 0.00 /100 WBC    Neutrophils (Absolute) 6.40 2.00 - 7.15 K/uL    Lymphs (Absolute) 2.57 1.00 - 4.80 K/uL    Monos (Absolute) 0.49 0.00 - 0.85 K/uL    Eos (Absolute) 0.15 0.00 - 0.51 K/uL    Baso (Absolute) 0.03 0.00 - 0.12 K/uL    Immature Granulocytes (abs) 0.03 0.00 - 0.11 K/uL    NRBC (Absolute) 0.00 K/uL   Complete Metabolic Panel (CMP)   Result Value Ref Range    Sodium 138 135 - 145 mmol/L     Potassium 4.0 3.6 - 5.5 mmol/L    Chloride 102 96 - 112 mmol/L    Co2 25 20 - 33 mmol/L    Anion Gap 11.0 7.0 - 16.0    Glucose 100 (H) 65 - 99 mg/dL    Bun 20 8 - 22 mg/dL    Creatinine 0.61 0.50 - 1.40 mg/dL    Calcium 9.5 8.5 - 10.5 mg/dL    AST(SGOT) 20 12 - 45 U/L    ALT(SGPT) 18 2 - 50 U/L    Alkaline Phosphatase 97 30 - 99 U/L    Total Bilirubin 0.3 0.1 - 1.5 mg/dL    Albumin 4.5 3.2 - 4.9 g/dL    Total Protein 8.4 (H) 6.0 - 8.2 g/dL    Globulin 3.9 (H) 1.9 - 3.5 g/dL    A-G Ratio 1.2 g/dL   Troponin   Result Value Ref Range    Troponin T 6 6 - 19 ng/L   ESTIMATED GFR   Result Value Ref Range    GFR If African American >60 >60 mL/min/1.73 m 2    GFR If Non African American >60 >60 mL/min/1.73 m 2   EKG   Result Value Ref Range    Report       [facility] Emergency Dept.    Test Date:  2021  Pt Name:    ALEXIS LIPSCOMB                 Department: ER  MRN:        [id number]                      Room:  Gender:     Female                       Technician: [id number]  :        [id number]                   Requested By:ER TRIAGE PROTOCOL  Order #:    [id number]                    Reading MD:    Measurements  Intervals                                Axis  Rate:       102                          P:          76  MN:         193                          QRS:        40  QRSD:       83                           T:          54  QT:         335  QTc:        437    Interpretive Statements  Sinus tachycardia  Low voltage, precordial leads  Compared to ECG 2021 13:03:55  Low QRS voltage now present  Sinus rhythm no longer present        EKG interpretation by me rate 102 sinus tachycardia low voltage no acute ST segment elevation or depression no pathological T wave inversion no ectopy.  Intervals and axis otherwise normal    COURSE & MEDICAL DECISION MAKING  Pertinent Labs & Imaging studies reviewed. (See chart for details)  Patient presents here with chest pressure that appears to be exertional with  palpitations.  She is already been seen by cardiology today.  We will admit her to internal medicine.  They plan on performing heart catheterization in the morning.  She did undergo stat echocardiogram this evening initial troponin and EKG do not suggest STEMI therefore we will admit her to telemetry for further observation in anticipation of additional testing tomorrow    FINAL IMPRESSION  1.  Chest pain      Electronically signed by: Crow Arrieta M.D., 4/29/2021 5:25 PM

## 2021-04-30 NOTE — ASSESSMENT & PLAN NOTE
Not well controlled.  Resume losartan 50 mg daily  Consider adding additional blood pressure medications as needed  IV labetalol as needed

## 2021-04-30 NOTE — PROGRESS NOTES
Received report from KATHRIN Haas. Transported pt via gurney while monitoring on ZOLL. Tele monitor put on pt, SR-70s-80s. Reviewed POC, no questions at this time. Call light is within reach, bed is in lowest/locked position.

## 2021-04-30 NOTE — DIETARY
NUTRITION SERVICES: BMI - Pt with BMI >40 (=Body mass index is 44.1 kg/m².), Class III obesity. Weight loss counseling not appropriate in acute care setting. RECOMMEND - If appropriate at DC please refer to outpatient nutrition services for weight management.

## 2021-05-01 ENCOUNTER — PATIENT OUTREACH (OUTPATIENT)
Dept: HEALTH INFORMATION MANAGEMENT | Facility: OTHER | Age: 71
End: 2021-05-01

## 2021-05-01 ENCOUNTER — TELEPHONE (OUTPATIENT)
Dept: CARDIOLOGY | Facility: MEDICAL CENTER | Age: 71
End: 2021-05-01

## 2021-05-01 ENCOUNTER — APPOINTMENT (OUTPATIENT)
Dept: CARDIOLOGY | Facility: MEDICAL CENTER | Age: 71
DRG: 287 | End: 2021-05-01
Attending: NURSE PRACTITIONER
Payer: MEDICARE

## 2021-05-01 VITALS
SYSTOLIC BLOOD PRESSURE: 144 MMHG | RESPIRATION RATE: 16 BRPM | HEART RATE: 80 BPM | OXYGEN SATURATION: 90 % | TEMPERATURE: 98.5 F | HEIGHT: 65 IN | BODY MASS INDEX: 43.64 KG/M2 | WEIGHT: 261.91 LBS | DIASTOLIC BLOOD PRESSURE: 63 MMHG

## 2021-05-01 DIAGNOSIS — R00.2 PALPITATIONS: Primary | ICD-10-CM

## 2021-05-01 PROBLEM — F41.9 ANXIETY: Status: RESOLVED | Noted: 2021-04-29 | Resolved: 2021-05-01

## 2021-05-01 PROBLEM — J30.2 SEASONAL ALLERGIES: Chronic | Status: RESOLVED | Noted: 2021-04-30 | Resolved: 2021-05-01

## 2021-05-01 PROBLEM — E78.5 DYSLIPIDEMIA: Chronic | Status: RESOLVED | Noted: 2021-04-29 | Resolved: 2021-05-01

## 2021-05-01 PROBLEM — R01.1 SYSTOLIC MURMUR: Status: RESOLVED | Noted: 2021-04-29 | Resolved: 2021-05-01

## 2021-05-01 PROBLEM — F12.90 MARIJUANA USE, CONTINUOUS: Chronic | Status: RESOLVED | Noted: 2021-04-30 | Resolved: 2021-05-01

## 2021-05-01 PROBLEM — I10 ESSENTIAL HYPERTENSION: Status: RESOLVED | Noted: 2021-04-29 | Resolved: 2021-05-01

## 2021-05-01 PROBLEM — I20.0 UNSTABLE ANGINA (HCC): Status: RESOLVED | Noted: 2021-04-29 | Resolved: 2021-05-01

## 2021-05-01 PROBLEM — E87.6 HYPOKALEMIA: Status: RESOLVED | Noted: 2021-04-30 | Resolved: 2021-05-01

## 2021-05-01 LAB
ALBUMIN SERPL BCP-MCNC: 3.8 G/DL (ref 3.2–4.9)
ALBUMIN/GLOB SERPL: 1.1 G/DL
ALP SERPL-CCNC: 81 U/L (ref 30–99)
ALT SERPL-CCNC: 11 U/L (ref 2–50)
ANION GAP SERPL CALC-SCNC: 8 MMOL/L (ref 7–16)
AST SERPL-CCNC: 11 U/L (ref 12–45)
BILIRUB SERPL-MCNC: 0.3 MG/DL (ref 0.1–1.5)
BUN SERPL-MCNC: 15 MG/DL (ref 8–22)
CALCIUM SERPL-MCNC: 8.9 MG/DL (ref 8.5–10.5)
CHLORIDE SERPL-SCNC: 106 MMOL/L (ref 96–112)
CO2 SERPL-SCNC: 25 MMOL/L (ref 20–33)
CREAT SERPL-MCNC: 0.59 MG/DL (ref 0.5–1.4)
EKG IMPRESSION: NORMAL
ERYTHROCYTE [DISTWIDTH] IN BLOOD BY AUTOMATED COUNT: 40.8 FL (ref 35.9–50)
GLOBULIN SER CALC-MCNC: 3.4 G/DL (ref 1.9–3.5)
GLUCOSE SERPL-MCNC: 97 MG/DL (ref 65–99)
HCT VFR BLD AUTO: 41.6 % (ref 37–47)
HGB BLD-MCNC: 13.5 G/DL (ref 12–16)
MAGNESIUM SERPL-MCNC: 2 MG/DL (ref 1.5–2.5)
MCH RBC QN AUTO: 29 PG (ref 27–33)
MCHC RBC AUTO-ENTMCNC: 32.5 G/DL (ref 33.6–35)
MCV RBC AUTO: 89.3 FL (ref 81.4–97.8)
PHOSPHATE SERPL-MCNC: 4.4 MG/DL (ref 2.5–4.5)
PLATELET # BLD AUTO: 233 K/UL (ref 164–446)
PMV BLD AUTO: 11.4 FL (ref 9–12.9)
POTASSIUM SERPL-SCNC: 4 MMOL/L (ref 3.6–5.5)
PROT SERPL-MCNC: 7.2 G/DL (ref 6–8.2)
RBC # BLD AUTO: 4.66 M/UL (ref 4.2–5.4)
SODIUM SERPL-SCNC: 139 MMOL/L (ref 135–145)
WBC # BLD AUTO: 7.9 K/UL (ref 4.8–10.8)

## 2021-05-01 PROCEDURE — 85027 COMPLETE CBC AUTOMATED: CPT

## 2021-05-01 PROCEDURE — 99232 SBSQ HOSP IP/OBS MODERATE 35: CPT | Mod: 25 | Performed by: INTERNAL MEDICINE

## 2021-05-01 PROCEDURE — C1894 INTRO/SHEATH, NON-LASER: HCPCS

## 2021-05-01 PROCEDURE — A9270 NON-COVERED ITEM OR SERVICE: HCPCS | Performed by: NURSE PRACTITIONER

## 2021-05-01 PROCEDURE — A9270 NON-COVERED ITEM OR SERVICE: HCPCS

## 2021-05-01 PROCEDURE — 700102 HCHG RX REV CODE 250 W/ 637 OVERRIDE(OP)

## 2021-05-01 PROCEDURE — 700102 HCHG RX REV CODE 250 W/ 637 OVERRIDE(OP): Performed by: NURSE PRACTITIONER

## 2021-05-01 PROCEDURE — 99152 MOD SED SAME PHYS/QHP 5/>YRS: CPT | Performed by: INTERNAL MEDICINE

## 2021-05-01 PROCEDURE — 700111 HCHG RX REV CODE 636 W/ 250 OVERRIDE (IP)

## 2021-05-01 PROCEDURE — 83735 ASSAY OF MAGNESIUM: CPT

## 2021-05-01 PROCEDURE — 84100 ASSAY OF PHOSPHORUS: CPT

## 2021-05-01 PROCEDURE — G0278 ILIAC ART ANGIO,CARDIAC CATH: HCPCS | Performed by: INTERNAL MEDICINE

## 2021-05-01 PROCEDURE — 700101 HCHG RX REV CODE 250

## 2021-05-01 PROCEDURE — 93567 NJX CAR CTH SPRVLV AORTGRPHY: CPT | Performed by: INTERNAL MEDICINE

## 2021-05-01 PROCEDURE — 93458 L HRT ARTERY/VENTRICLE ANGIO: CPT | Mod: 26 | Performed by: INTERNAL MEDICINE

## 2021-05-01 PROCEDURE — 4A023N7 MEASUREMENT OF CARDIAC SAMPLING AND PRESSURE, LEFT HEART, PERCUTANEOUS APPROACH: ICD-10-PCS | Performed by: INTERNAL MEDICINE

## 2021-05-01 PROCEDURE — 700102 HCHG RX REV CODE 250 W/ 637 OVERRIDE(OP): Performed by: INTERNAL MEDICINE

## 2021-05-01 PROCEDURE — 93010 ELECTROCARDIOGRAM REPORT: CPT | Mod: 59 | Performed by: INTERNAL MEDICINE

## 2021-05-01 PROCEDURE — B2151ZZ FLUOROSCOPY OF LEFT HEART USING LOW OSMOLAR CONTRAST: ICD-10-PCS | Performed by: INTERNAL MEDICINE

## 2021-05-01 PROCEDURE — 80053 COMPREHEN METABOLIC PANEL: CPT

## 2021-05-01 PROCEDURE — A9270 NON-COVERED ITEM OR SERVICE: HCPCS | Performed by: INTERNAL MEDICINE

## 2021-05-01 PROCEDURE — 700117 HCHG RX CONTRAST REV CODE 255: Performed by: INTERNAL MEDICINE

## 2021-05-01 PROCEDURE — 99239 HOSP IP/OBS DSCHRG MGMT >30: CPT | Mod: GC | Performed by: INTERNAL MEDICINE

## 2021-05-01 PROCEDURE — B2111ZZ FLUOROSCOPY OF MULTIPLE CORONARY ARTERIES USING LOW OSMOLAR CONTRAST: ICD-10-PCS | Performed by: INTERNAL MEDICINE

## 2021-05-01 RX ORDER — HEPARIN SODIUM 1000 [USP'U]/ML
INJECTION, SOLUTION INTRAVENOUS; SUBCUTANEOUS
Status: COMPLETED
Start: 2021-05-01 | End: 2021-05-01

## 2021-05-01 RX ORDER — LIDOCAINE HYDROCHLORIDE 20 MG/ML
INJECTION, SOLUTION INFILTRATION; PERINEURAL
Status: COMPLETED
Start: 2021-05-01 | End: 2021-05-01

## 2021-05-01 RX ORDER — MIDAZOLAM HYDROCHLORIDE 1 MG/ML
INJECTION INTRAMUSCULAR; INTRAVENOUS
Status: COMPLETED
Start: 2021-05-01 | End: 2021-05-01

## 2021-05-01 RX ORDER — VERAPAMIL HYDROCHLORIDE 2.5 MG/ML
INJECTION, SOLUTION INTRAVENOUS
Status: COMPLETED
Start: 2021-05-01 | End: 2021-05-01

## 2021-05-01 RX ORDER — HEPARIN SODIUM 200 [USP'U]/100ML
INJECTION, SOLUTION INTRAVENOUS
Status: COMPLETED
Start: 2021-05-01 | End: 2021-05-01

## 2021-05-01 RX ORDER — ASPIRIN 81 MG/1
TABLET, CHEWABLE ORAL
Status: COMPLETED
Start: 2021-05-01 | End: 2021-05-01

## 2021-05-01 RX ORDER — LOSARTAN POTASSIUM 50 MG/1
75 TABLET ORAL
Qty: 90 TABLET | Refills: 1 | Status: SHIPPED | OUTPATIENT
Start: 2021-05-01 | End: 2021-05-17

## 2021-05-01 RX ORDER — SPIRONOLACTONE 25 MG/1
25 TABLET ORAL
Status: DISCONTINUED | OUTPATIENT
Start: 2021-05-01 | End: 2021-05-01 | Stop reason: HOSPADM

## 2021-05-01 RX ORDER — SPIRONOLACTONE 25 MG/1
25 TABLET ORAL DAILY
Qty: 60 TABLET | Refills: 1 | Status: SHIPPED | OUTPATIENT
Start: 2021-05-02 | End: 2021-05-17 | Stop reason: SDUPTHER

## 2021-05-01 RX ADMIN — NITROGLYCERIN 10 ML: 20 INJECTION INTRAVENOUS at 08:25

## 2021-05-01 RX ADMIN — LOSARTAN POTASSIUM 50 MG: 50 TABLET, FILM COATED ORAL at 05:17

## 2021-05-01 RX ADMIN — LIDOCAINE HYDROCHLORIDE: 20 INJECTION, SOLUTION INFILTRATION; PERINEURAL at 08:26

## 2021-05-01 RX ADMIN — IOHEXOL 110 ML: 350 INJECTION, SOLUTION INTRAVENOUS at 08:45

## 2021-05-01 RX ADMIN — CETIRIZINE HYDROCHLORIDE 10 MG: 10 TABLET, FILM COATED ORAL at 05:17

## 2021-05-01 RX ADMIN — VERAPAMIL HYDROCHLORIDE 2.5 MG: 2.5 INJECTION, SOLUTION INTRAVENOUS at 08:25

## 2021-05-01 RX ADMIN — OXYCODONE HYDROCHLORIDE AND ACETAMINOPHEN 1000 MG: 500 TABLET ORAL at 05:17

## 2021-05-01 RX ADMIN — HEPARIN SODIUM: 1000 INJECTION, SOLUTION INTRAVENOUS; SUBCUTANEOUS at 08:37

## 2021-05-01 RX ADMIN — ASPIRIN 324 MG: 81 TABLET, CHEWABLE ORAL at 08:26

## 2021-05-01 RX ADMIN — SPIRONOLACTONE 25 MG: 25 TABLET ORAL at 10:44

## 2021-05-01 RX ADMIN — Medication 1000 UNITS: at 05:17

## 2021-05-01 RX ADMIN — FENTANYL CITRATE 50 MCG: 50 INJECTION, SOLUTION INTRAMUSCULAR; INTRAVENOUS at 08:29

## 2021-05-01 RX ADMIN — MIDAZOLAM HYDROCHLORIDE 1 MG: 1 INJECTION, SOLUTION INTRAMUSCULAR; INTRAVENOUS at 08:29

## 2021-05-01 RX ADMIN — HEPARIN SODIUM: 200 INJECTION, SOLUTION INTRAVENOUS at 08:00

## 2021-05-01 ASSESSMENT — ENCOUNTER SYMPTOMS
APNEA: 0
SHORTNESS OF BREATH: 0
COUGH: 0
CHOKING: 0
WHEEZING: 0
STRIDOR: 0
CHEST TIGHTNESS: 0

## 2021-05-01 NOTE — DISCHARGE INSTRUCTIONS
-Follow-up with cardiology for moderate aortic stenosis.  -Follow-up with primary care physician for the management of hypertension and other chronic medical problems    Discharge Instructions    Discharged to home by car with relative. Discharged via wheelchair, hospital escort: Yes.  Special equipment needed: Not Applicable    Be sure to schedule a follow-up appointment with your primary care doctor or any specialists as instructed.     Discharge Plan:   Diet Plan: Discussed  Activity Level: Discussed  Confirmed Follow up Appointment: Patient to Call and Schedule Appointment  Confirmed Symptoms Management: Discussed  Medication Reconciliation Updated: Yes    I understand that a diet low in cholesterol, fat, and sodium is recommended for good health. Unless I have been given specific instructions below for another diet, I accept this instruction as my diet prescription.   Other diet: Low salt diet    Special Instructions: None    · Is patient discharged on Warfarin / Coumadin?   No     Depression / Suicide Risk    As you are discharged from this [facility], it is important to learn how to keep safe from harming yourself.    Recognize the warning signs:  · Abrupt changes in personality, positive or negative- including increase in energy   · Giving away possessions  · Change in eating patterns- significant weight changes-  positive or negative  · Change in sleeping patterns- unable to sleep or sleeping all the time   · Unwillingness or inability to communicate  · Depression  · Unusual sadness, discouragement and loneliness  · Talk of wanting to die  · Neglect of personal appearance   · Rebelliousness- reckless behavior  · Withdrawal from people/activities they love  · Confusion- inability to concentrate     If you or a loved one observes any of these behaviors or has concerns about self-harm, here's what you can do:  · Talk about it- your feelings and reasons for harming yourself  · Remove any means that  you might use to hurt yourself (examples: pills, rope, extension cords, firearm)  · Get professional help from the community (Mental Health, Substance Abuse, psychological counseling)  · Do not be alone:Call your Safe Contact- someone whom you trust who will be there for you.  · Call your local CRISIS HOTLINE 033-7127 or 918-000-6852  · Call your local Children's Mobile Crisis Response Team [place] (955) 897-4877 or wwwironSource  · Call the toll free National Suicide Prevention Hotlines   · National Suicide Prevention Lifeline 512-337-NMQQ (5794)  · National Hope Line Network 800-SUICIDE (062-4924)    Spironolactone tablets  What is this medicine?  SPIRONOLACTONE (hiren on oh LAK tone) is a diuretic. It helps you make more urine and to lose excess water from your body. This medicine is used to treat high blood pressure, and edema or swelling from heart, kidney, or liver disease. It is also used to treat patients who make too much aldosterone or have low potassium.  This medicine may be used for other purposes; ask your health care provider or pharmacist if you have questions.  COMMON BRAND NAME(S): Aldactone  What should I tell my health care provider before I take this medicine?  They need to know if you have any of these conditions:  · high blood level of potassium  · kidney disease or trouble making urine  · liver disease  · an unusual or allergic reaction to spironolactone, other medicines, foods, dyes, or preservatives  · pregnant or trying to get pregnant  · breast-feeding  How should I use this medicine?  Take this medicine by mouth with a drink of water. Follow the directions on your prescription label. You can take it with or without food. If it upsets your stomach, take it with food. Do not take your medicine more often than directed. Remember that you will need to pass more urine after taking this medicine. Do not take your doses at a time of day that will cause you problems. Do not take at  bedtime.  Talk to your pediatrician regarding the use of this medicine in children. While this drug may be prescribed for selected conditions, precautions do apply.  Overdosage: If you think you have taken too much of this medicine contact a poison control center or emergency room at once.  NOTE: This medicine is only for you. Do not share this medicine with others.  What if I miss a dose?  If you miss a dose, take it as soon as you can. If it is almost time for your next dose, take only that dose. Do not take double or extra doses.  What may interact with this medicine?  Do not take this medicine with any of the following medications:  · cidofovir  · eplerenone  · tranylcypromine  This medicine may also interact with the following medications:  · aspirin  · certain medicines for blood pressure or heart disease like benazepril, lisinopril, losartan, valsartan  · certain medicines that treat or prevent blood clots like heparin and enoxaparin  · cholestyramine  · cyclosporine  · digoxin  · lithium  · medicines that relax muscles for surgery  · NSAIDs, medicines for pain and inflammation, like ibuprofen or naproxen  · other diuretics  · potassium supplements  · steroid medicines like prednisone or cortisone  · trimethoprim  This list may not describe all possible interactions. Give your health care provider a list of all the medicines, herbs, non-prescription drugs, or dietary supplements you use. Also tell them if you smoke, drink alcohol, or use illegal drugs. Some items may interact with your medicine.  What should I watch for while using this medicine?  Visit your doctor or health care professional for regular checks on your progress. Check your blood pressure as directed. Ask your doctor what your blood pressure should be, and when you should contact them.  You may need to be on a special diet while taking this medicine. Ask your doctor. Also, ask how many glasses of fluid you need to drink a day. You must not get  dehydrated.  This medicine may make you feel confused, dizzy or lightheaded. Drinking alcohol and taking some medicines can make this worse. Do not drive, use machinery, or do anything that needs mental alertness until you know how this medicine affects you. Do not sit or stand up quickly.  What side effects may I notice from receiving this medicine?  Side effects that you should report to your doctor or health care professional as soon as possible:  · allergic reactions such as skin rash or itching, hives, swelling of the lips, mouth, tongue, or throat  · black or tarry stools  · fast, irregular heartbeat  · fever  · muscle pain, cramps  · numbness, tingling in hands or feet  · trouble breathing  · trouble passing urine  · unusual bleeding  · unusually weak or tired  Side effects that usually do not require medical attention (report to your doctor or health care professional if they continue or are bothersome):  · change in voice or hair growth  · confusion  · dizzy, drowsy  · dry mouth, increased thirst  · enlarged or tender breasts  · headache  · irregular menstrual periods  · sexual difficulty, unable to have an erection  · stomach upset  This list may not describe all possible side effects. Call your doctor for medical advice about side effects. You may report side effects to FDA at 5-174-FDA-3679.  Where should I keep my medicine?  Keep out of the reach of children.  Store below 25 degrees C (77 degrees F). Throw away any unused medicine after the expiration date.  NOTE: This sheet is a summary. It may not cover all possible information. If you have questions about this medicine, talk to your doctor, pharmacist, or health care provider.  © 2020 [employer]vier/Gold Standard (2018-05-04 09:42:28)

## 2021-05-01 NOTE — PROGRESS NOTES
Patient belongings gathered. Tele monitor removed. Patient transported to discharge lounge via wheelchair.

## 2021-05-01 NOTE — PROCEDURES
DATE OF PROCEDURE:  05/01/2021     REFERRING PHYSICIAN:  Joshua Sanchez MD     PROCEDURES:  1.  Left heart catheterization.  2.  Coronary angiography.  3.  Left ventriculogram.  4.  Ascending aortogram.  5.  Descending aortogram.  6.  Monitor conscious sedation.     PREPROCEDURE DIAGNOSES:  1.  Chest pain.  2.  Aortic stenosis.     POSTPROCEDURE DIAGNOSES:  1.  No angiographic evidence of coronary artery disease.  2.  Normal left ventricular systolic function with ejection fraction of 65%.  3.  Elevated left ventricular end diastolic pressure.  4.  Aortic stenosis with peak-to-peak gradient of 40 mmHg.     INDICATIONS:  The patient is a 71-year-old female with past medical history   significant for hypertension and diabetes.  She was admitted to [facility] with chest pain.  She underwent an echocardiogram, which showed   moderate aortic stenosis.  She was scheduled for cardiac catheterization.     DESCRIPTION OF PROCEDURE:  After informed consent was signed by the patient,   the patient was brought to the cardiac catheterization laboratory.  She was   prepped and draped in the usual sterile manner.  The right wrist area was   anesthetized with 2% Xylocaine.  A 6-Peruvian sheath was inserted into the right   radial artery using a modified Seldinger technique.  Intra-arterial verapamil   and nitroglycerin were given.  IV heparin was given.  A 4-Peruvian JL3.5   catheter was positioned into the left main coronary artery.  Coronary   angiography was performed.  This was exchanged for 4-Peruvian JR4 catheter,   which was positioned into the right coronary artery.  Coronary angiography was   performed.  This catheter was used to cross the aortic valve with a straight   wire and exchanged it over an exchange length wire for 4-Peruvian pigtail   catheter, which was positioned into the left ventricle.  Left angiography was   performed.  This catheter was retracted to ascending aorta and an aortogram   was  performed.  This catheter was directed to the descending aorta and   descending aortogram was performed.  The patient tolerated the procedure well.    At the end of procedure, all catheters and sheaths were removed.  Hemoband   was placed in the right wrist.  She was transferred back to telemetry in   stable condition.     HEMODYNAMIC DATA:  Hemodynamic data shows an aortic pressures of 180/90 mmHg   with mean of 110 mmHg and /0 mmHg with LVEDP of 35 mmHg.     AORTIC VALVE:  There was a 40 mm peak-to-peak gradient noted.     LEFT VENTRICULOGRAM:  A 10 mL of contrast was delivered for 2 seconds.    Ejection fraction was estimated to be 65%.  There was no segmental wall motion   abnormalities noted.     ASCENDING AORTOGRAM:  A 10 mL of contrast were delivered for 2 seconds.    Ascending aorta appeared within normal limits.     DESCENDING AORTOGRAM:  A 10 mL of contrast were delivered for 2 seconds.    Distal aorta and bilateral iliofemoral system were within normal limits.    There was high bifurcation of the right femoral artery.     IMPRESSION:  1.  No angiographic evidence of coronary artery disease.  2.  Normal left ventricular systolic function with ejection fraction of 65%.  3.  Elevated left ventricular end diastolic pressure.  4.  Aortic stenosis with peak-to-peak gradient of 40 mmHg.  5.  Showing normal-appearing ascending aorta.  6.  Normal-appearing distal aorta and bilateral iliofemoral system without   significant stenosis or stenosis.  High bifurcation of the right femoral   artery.     SEDATION TIME: The patient's sedation was managed by myself with continuous   face to face time with the patient for 15 minutes from 08:10 to 08:25.     NOTIFICATION:  Her , Celestino was notified at 8:45.    ______________________________  MD ROSARIO DELGADILLO/NIKITA    DD:  05/01/2021 08:48  DT:  05/01/2021 09:22    Job#:  [id number]

## 2021-05-01 NOTE — PROGRESS NOTES
Discharge instructions reviewed and questions. PIV removed and catheter intact. Core measure sheet completed by Olivia Solis RN. Pt has f/u with APRN with cardiology. Pt educated on restrictions with R wrist insertion site and to notify MD if any s/s of infection. Pt wheeled to car and transported home.

## 2021-05-01 NOTE — NON-PROVIDER
This note is intended for the purposes of medical student education and feedback only.   Please refer to the documentation by this patient's assigned medical practitioner for details of care and plans.    Reason for admission: (Patient is a BLANK year old BLANK who was admitted with BLANK.)     HD/POD#:     SUBJECTIVE  (Overnight events?)  (How the patient is today?)    OBJECTIVE   Vital Signs:  • Max 24 hour temp:  • Current temp:  • Current HR:  • Current BP:  • Current RR:  • Current O2 Sat: BLANK (on BLANK liters of O2)    Physical Exam (Components adjusted/added/removed when applicable):  General:   HEENT:   Cardiovascular:   Respiratory:   Abdominal exam:   Extremities:   Neurological:     Ins/Outs:  • P/O Intake:  • IV Intake:  • Urine output:  • Drain output:  • Other output:    Lab Results:  Recent Labs     04/29/21 1707 04/30/21 0217 05/01/21  0145   WBC 9.7 8.6 7.9   RBC 5.11 4.67 4.66   HEMOGLOBIN 14.6 14.5 13.5   HEMATOCRIT 45.8 42.4 41.6   MCV 89.6 90.8 89.3   MCH 28.6 31.0 29.0   MCHC 31.9* 34.2 32.5*   RDW 40.5 41.0 40.8   PLATELETCT 260 238 233   MPV 11.4 11.3 11.4     Recent Labs     04/29/21 1707 04/30/21 0217 05/01/21  0145   SODIUM 138 138 139   POTASSIUM 4.0 3.7 4.0   CHLORIDE 102 104 106   CO2 25 26 25   GLUCOSE 100* 104* 97   BUN 20 15 15   CREATININE 0.61 0.52 0.59   CALCIUM 9.5 8.9 8.9         Recent Labs     04/29/21 1707 04/30/21 0217 05/01/21  0145   ASTSGOT 20 15 11*   ALTSGPT 18 13 11   TBILIRUBIN 0.3 0.4 0.3   ALKPHOSPHAT 97 82 81   GLOBULIN 3.9* 3.3 3.4       Imaging Results:  L heart catheterization - Pending result     ASSESSMENT/PLAN  1) Unstable angina: Assessment & Plan: Presented directly from Dr. Sanchez's office for this; chest pain worsened with exertion. Pt currently denies chest pain.  Troponin negative x2.  EKG showed no acute ischemic changes but moderate aortic stenosis.  Patient states that she has never been evaluated by cardiology before; Dr. Sanchez appointment on  4/29/2021 was her first of her cardiology appointment.  Heart catheterization result  pending to evaluate for CAD, also to look at anatomy of the heart, patient is currently n.p.o. Nitroglycerin as needed for chest pain  Continue to monitor on telemetry    2) Essential HTN: Not well controlled. Resume losartan 50 mg daily and IV labetalol.     3) Palpitations: Patient had 1-2 month history, and echo revealed moderate aortic stenosis, and she underwent coronary catheterization with possible valve replacement. Results are pending. Continue to monitor via telemetry.    4) Dyslipidemia: Assessment & Plan: Lipid panel showed cholesterol was normal; HDL 65, LDL 81, total cholesterol 159.  This is within range for her.Resume home atorvastatin. If patient test positive for diabetes, goal of LDL less than 70 per guidelines. May need to increase atorvastatin dose depending on findings from heart cath    5) Anxiety: Assessment & Plan: Melatonin nightly. As needed Seroquel 25 mg nightly, can also add trazodone if needed  Avoid benzodiazepines if possible    6) Hypokalemia: resolved    7) Mariajuana use: encourage cessation and monitor for signs of canniboid hyperemesis      PROPHYLAXIS  • DVT: Lovenox

## 2021-05-01 NOTE — DISCHARGE SUMMARY
Discharge Summary    Date of Admission: 4/29/2021  Date of Discharge: 5/1/2021  Discharging Attending: Jeffry Zarco M.D.   Discharging Senior Resident: Dr. Obrien  Discharging Intern: Dr. Ontiveros    CHIEF COMPLAINT ON ADMISSION  Chief Complaint   Patient presents with   • Chest Pressure   • Rapid Heart Beat     intermittent x1mo       Reason for Admission  Chest pain.    Admission Date  4/29/2021    CODE STATUS  Full Code    HPI & HOSPITAL COURSE  This is a 71 y.o. female with past medical history of hypertension, hyperlipidemia, supraventricular cardia, who presented here on 04/29 with a chief complaint of chest pain.  As per patient, she was having intermittent episodes of nonexertional pressure-like chest discomfort, lasting for 1 to 2 minutes without any associated symptoms. She was seen in the cardiology clinic and was found to have loud systolic ejection murmur. Her symptoms were thought to be secondary to severe aortic stenosis and therefore, she was admitted for further management.  On presentation, she was mildly tachycardic with IL in 90s and hypertensive with blood pressure of 180/88.  EKG did not show any significant acute ischemic ST segment or T wave changes and troponin X 2 were negative. Transthoracic echocardiogram showed left ventricular ejection fraction of 70% with normal regional wall motion. It also showed moderate aortic stenosis with peak gradient of 36 and mean gradient of 26 mmHg.  She had a left heart catheterization on 05/1 which showed no evidence of coronary artery disease but showed moderate aortic stenosis and elevated ventricular end-diastolic pressure.  For hypertension, losartan was titrated up to 75 mg/day and spironolactone was added.  All other home medications are continued. She did not have any more episodes of chest pressure, shortness of breath or other cardiopulmonary symptoms and she remained hemodynamically stable during hospitalization.  She will have her follow-up  with cardiology outpatient for further monitoring.    Therefore, she is discharged in good and stable condition to home with close outpatient follow-up.    The patient met 2-midnight criteria for an inpatient stay at the time of discharge.       PHYSICAL EXAM ON DISCHARGE  Temp:  [36.2 °C (97.1 °F)-37.3 °C (99.2 °F)] 36.8 °C (98.3 °F)  Pulse:  [] 72  Resp:  [17-20] 17  BP: (114-155)/(39-84) 128/68  SpO2:  [90 %-94 %] 93 %      Physical Exam  Constitutional:       General: She is not in acute distress.     Appearance: Normal appearance. She is obese. She is not ill-appearing.   HENT:      Head: Normocephalic and atraumatic.      Nose: Nose normal. No congestion or rhinorrhea.      Mouth/Throat:      Mouth: Mucous membranes are moist.      Pharynx: Oropharynx is clear. No posterior oropharyngeal erythema.   Eyes:      Conjunctiva/sclera: Conjunctivae normal.      Pupils: Pupils are equal, round, and reactive to light.   Cardiovascular:      Rate and Rhythm: Normal rate and regular rhythm.      Pulses: Normal pulses.      Heart sounds: Murmur present.      Comments: Systolic ejection murmur present  Pulmonary:      Effort: Pulmonary effort is normal. No respiratory distress.      Breath sounds: Normal breath sounds. No wheezing.   Abdominal:      General: Abdomen is flat. Bowel sounds are normal.      Palpations: Abdomen is soft.   Musculoskeletal:         General: No swelling or tenderness. Normal range of motion.      Cervical back: Normal range of motion. No tenderness.   Skin:     General: Skin is warm.   Neurological:      General: No focal deficit present.      Mental Status: She is alert and oriented to person, place, and time.   Psychiatric:         Mood and Affect: Mood normal.         Behavior: Behavior normal.       Discharge Date  5/1/2021    FOLLOW UP ITEMS POST DISCHARGE  -Follow-up with cardiology clinic for aortic stenosis.  -Follow-up with primary care physician for management of hypertension and  other chronic medical problems.    DISCHARGE DIAGNOSES  Principal Problem (Resolved):    Unstable angina (HCC) POA: Yes  Active Problems:    Dyslipidemia (Chronic) POA: Yes  Resolved Problems:    Systolic murmur POA: Yes    Palpitations POA: Yes    Essential hypertension POA: Yes    Anxiety POA: Yes    Seasonal allergies (Chronic) POA: Yes    Hypokalemia POA: Yes    Marijuana use, continuous (Chronic) POA: Yes      FOLLOW UP  Future Appointments   Date Time Provider Department Center   5/17/2021 10:30 AM GABRIELLE Hernandez [facility] None     GABRIELLE Gates  [address] [id number] [phone]    Call  Please call your Primary Care Office to schedule a follow up appointment. Thank you.      MEDICATIONS ON DISCHARGE     Medication List      START taking these medications      Instructions   spironolactone 25 MG Tabs  Start taking on: May 2, 2021  Commonly known as: ALDACTONE   Take 1 tablet by mouth every day for 60 days.  Dose: 25 mg        CHANGE how you take these medications      Instructions   losartan 50 MG Tabs  What changed: how much to take  Commonly known as: COZAAR   Take 1.5 Tablets by mouth every day for 60 days.  Dose: 75 mg        CONTINUE taking these medications      Instructions   ascorbic acid 500 MG tablet  Commonly known as: Vitamin C   Take 1,000 mg by mouth every day.  Dose: 1,000 mg     aspirin 81 MG Chew chewable tablet  Commonly known as: ASA   Chew 81 mg every evening.  Dose: 81 mg     atorvastatin 10 MG Tabs  Commonly known as: LIPITOR   Take 10 mg by mouth.  Dose: 10 mg     VITAMIN D3 PO   Take 1 Each by mouth every day.  Dose: 1 Each     ZINC SULFATE PO   Take 220 mg by mouth every day.  Dose: 220 mg     ZyrTEC Allergy 10 MG Tabs  Generic drug: cetirizine   Take 10 mg by mouth every day.  Dose: 10 mg        STOP taking these medications    AMOXICILLIN PO            Allergies  No Known Allergies    DIET  Orders Placed This Encounter   Procedures   • Diet  Order Diet: Cardiac     Standing Status:   Standing     Number of Occurrences:   1     Order Specific Question:   Diet:     Answer:   Cardiac [6]       ACTIVITY  As tolerated.  Weight bearing as tolerated    CONSULTATIONS  Dr. Armendariz with Cardiology consulted.  Treatment options were discussed and plan of care agreed upon.    PROCEDURES  05/01/2021-left heart catheterization.

## 2021-05-01 NOTE — CARE PLAN
Problem: Communication  Goal: The ability to communicate needs accurately and effectively will improve  Outcome: PROGRESSING AS EXPECTED     Problem: Safety  Goal: Will remain free from injury  Outcome: PROGRESSING AS EXPECTED  Goal: Will remain free from falls  Outcome: PROGRESSING AS EXPECTED    Pt remains free from falls, is aware of and iterated the need to call for staff if connected to IV line. Pt understands and expresses knowledge of all medical processes of her care

## 2021-05-01 NOTE — PROGRESS NOTES
Received evening report from day RN. Pt is comfortable and was reconnected up to NS continuous flow IV and informed to contact staff to go to bathroom or ambulate. Bed is locked in low position with call light and belongings within reach. POC discussed and all questions answered. All needs and requirements met at this time.

## 2021-05-03 ENCOUNTER — TELEPHONE (OUTPATIENT)
Dept: CARDIOLOGY | Facility: MEDICAL CENTER | Age: 71
End: 2021-05-03

## 2021-05-03 DIAGNOSIS — G47.00 INSOMNIA, UNSPECIFIED TYPE: ICD-10-CM

## 2021-05-03 NOTE — TELEPHONE ENCOUNTER
S/W pt, unsure about heart monitor because patient is traveling through with her RV and leaving [place] on 5/31/21    D/W RO, he still wants her to have a [employer] 30 day heart monitor, aware she will be out of town at time to turn it in.

## 2021-05-03 NOTE — TELEPHONE ENCOUNTER
GABRIELLE Martinez R.N.          Previous Messages       ----- Message -----   From: RATNA Carias   Sent: 5/1/2021  10:03 AM PDT   To: Jackie Robertson R.N.     Please forward this to Dr. Sanchez's RN         Please schedule a referral with pulmonary for sleep study thank you so much for sleep apnea     Referral placed   Chest pain Substance abuse Hyperemesis Encephalopathy

## 2021-05-03 NOTE — TELEPHONE ENCOUNTER
RO    Pt called stating she is unsure why she needs to get a cardiac event monitor and would like a call back to discuss at [phone].    Thank you

## 2021-05-05 ENCOUNTER — SLEEP CENTER VISIT (OUTPATIENT)
Dept: SLEEP MEDICINE | Facility: MEDICAL CENTER | Age: 71
End: 2021-05-05
Payer: MEDICARE

## 2021-05-05 VITALS
OXYGEN SATURATION: 88 % | HEART RATE: 95 BPM | WEIGHT: 261 LBS | SYSTOLIC BLOOD PRESSURE: 128 MMHG | RESPIRATION RATE: 16 BRPM | HEIGHT: 66 IN | DIASTOLIC BLOOD PRESSURE: 68 MMHG | BODY MASS INDEX: 41.95 KG/M2

## 2021-05-05 DIAGNOSIS — I51.9 HEART DISEASE (ORGANIC): ICD-10-CM

## 2021-05-05 DIAGNOSIS — E66.9 OBESITY WITHOUT SERIOUS COMORBIDITY, UNSPECIFIED CLASSIFICATION, UNSPECIFIED OBESITY TYPE: ICD-10-CM

## 2021-05-05 DIAGNOSIS — G47.30 SLEEP-RELATED BREATHING DISORDER: ICD-10-CM

## 2021-05-05 DIAGNOSIS — I47.10 SVT (SUPRAVENTRICULAR TACHYCARDIA) (HCC): ICD-10-CM

## 2021-05-05 PROCEDURE — 99203 OFFICE O/P NEW LOW 30 MIN: CPT | Performed by: PREVENTIVE MEDICINE

## 2021-05-05 RX ORDER — ZOLPIDEM TARTRATE 5 MG/1
5 TABLET ORAL NIGHTLY PRN
Qty: 2 TABLET | Refills: 0 | Status: SHIPPED | OUTPATIENT
Start: 2021-05-05 | End: 2021-05-06

## 2021-05-05 ASSESSMENT — FIBROSIS 4 INDEX: FIB4 SCORE: 1.01

## 2021-05-05 NOTE — LETTER
"   Lucy Thomas M.D.  [facility] Pulmonary Medicine   [address] [address]  Phone: [phone] - Fax:             Encounter Date: 5/5/2021  Provider: Lucy Thomas M.D.  Location of Care: [facility]UL[facility]  [address] [facility] [place] [address]      Patient:   Anel Schaeffer   MR Number: [id number]   YOB: 1950     PROGRESS NOTE:  CC: \"At the end of April I started having chest pain and a strange rhythm in my chest and I was admitted here on April 29, 2021 and I was just discharged last Saturday.  The cardiology doctor wanted me to be seen for sleep apnea.\"    HPI:  Anel Schaeffer is a 71 y.o. female kindly referred by COSTA Gates. She had been admitted to [facility] for April 29, 2021 chief complaint of chest pain.  She was having intermittent episodes of nonexertional pressure-like chest discomfort lasting for 1 to 2 minutes without associated symptoms.  She was hospitalized and then seen by Dr. Sanchez, a  cardiology at [facility].  He suggested that she follow-up with a sleep study because she had developed heart arrhythmia, SVT.  She also has the following comorbid conditions hypertension, mixed lipidemia, recent onset of palpitations, and has a systolic heart murmur of unclear medical significance..      Symptom Summary:  Snoring: +  Very loud snoring:-  Witnessed apneas: +  Resuscitative snorts: +  Nocturnal shortness of breath: -  Non-restorative sleep: +  Insomnia:  (Takes about 1 hour to fall asleep)  Nocturnal awakenings: +  Nocturia: +  Fatigue: +  Sleepiness: -  Falls asleep accidentally: -   Napping or returning to bed after arising: +  Restless legs: -  Limb movements during sleep: -  Nocturnal headaches: -    Significant comorbidities and modifying factors include: Hypertension, SVT, undiagnosed heart murmur, and mixed lipidemia. .      Patient Active Problem List    Diagnosis " Date Noted   • SVT (supraventricular tachycardia) (HCC) 04/29/2021   • Insomnia 06/18/2014   • Depression 06/18/2014       Past Medical History:   Diagnosis Date   • Hypertension     off meds since 2012        Past Surgical History:   Procedure Laterality Date   • ABDOMINAL HYSTERECTOMY TOTAL      1989   • APPENDECTOMY      1966   • HUMERUS ORIF      3/2014  - right   • LIPOMA EXCISION      left leg   • SHOULDER ARTHROSCOPY      right - bone spurs.  Lew Barton - ortho   • TONSILLECTOMY      1956   • ULNA ORIF      left - 3/2014 -        Family History   Problem Relation Age of Onset   • Cancer Mother         throat  - smoker   • Other Father         My. Gravis   • Cancer Sister         ovarian - age 67   • Cancer Sister         melanoma       Social History     Socioeconomic History   • Marital status:      Spouse name: Not on file   • Number of children: Not on file   • Years of education: Not on file   • Highest education level: Not on file   Occupational History   • Not on file   Tobacco Use   • Smoking status: Never Smoker   • Smokeless tobacco: Never Used   Substance and Sexual Activity   • Alcohol use: Yes     Comment: socially    • Drug use: Yes     Frequency: 7.0 times per week     Types: Marijuana, Oral, Inhaled     Comment: for sleep    • Sexual activity: Yes     Birth control/protection: Surgical     Comment: ; two boys; wk:  [employer]   Other Topics Concern   • Not on file   Social History Narrative   • Not on file     Social Determinants of Health     Financial Resource Strain:    • Difficulty of Paying Living Expenses:    Food Insecurity:    • Worried About Running Out of Food in the Last Year:    • Ran Out of Food in the Last Year:    Transportation Needs:    • Lack of Transportation (Medical):    • Lack of Transportation (Non-Medical):    Physical Activity:    • Days of Exercise per Week:    • Minutes of Exercise per Session:    Stress:    • Feeling of Stress :     Social Connections:    • Frequency of Communication with Friends and Family:    • Frequency of Social Gatherings with Friends and Family:    • Attends [employer] Services:    • Active Member of Clubs or Organizations:    • Attends Club or Organization Meetings:    • Marital Status:    Intimate Partner Violence:    • Fear of Current or Ex-Partner:    • Emotionally Abused:    • Physically Abused:    • Sexually Abused:        Current Outpatient Medications   Medication Sig Dispense Refill   • zolpidem (AMBIEN) 5 MG Tab Take 1 tablet by mouth at bedtime as needed for Sleep for up to 1 day. 2 tablet 0   • losartan (COZAAR) 50 MG Tab Take 1.5 Tablets by mouth every day for 60 days. 90 tablet 1   • spironolactone (ALDACTONE) 25 MG Tab Take 1 tablet by mouth every day for 60 days. 60 tablet 1   • atorvastatin (LIPITOR) 10 MG Tab Take 10 mg by mouth.     • cetirizine (ZYRTEC ALLERGY) 10 MG Tab Take 10 mg by mouth every day.     • aspirin (ASA) 81 MG Chew Tab chewable tablet Chew 81 mg every evening.     • ascorbic acid (VITAMIN C) 500 MG tablet Take 1,000 mg by mouth every day.     • Cholecalciferol (VITAMIN D3 PO) Take 1 Each by mouth every day.     • ZINC SULFATE PO Take 220 mg by mouth every day.       No current facility-administered medications for this visit.       ALLERGIES: Patient has no known allergies.    ROS    Constitutional: Denies fever, chills, sweats,  weight loss, fatigue.  Eyes: Denies vision loss, pain, drainage, double vision, glasses.  Ears/Nose/Mouth/Throat: Denies earache, difficulty hearing, rhinitis/nasal congestion, injury, recurrent sore throat, persistent hoarseness, decayed teeth/toothaches, ringing or buzzing in the ears.  Cardiovascular: Denies chest pain, tightness, palpitations, swelling in legs/feet, fainting, difficulty breathing when lying down but gets better when sitting up.   Respiratory: Denies shortness of breath, cough, sputum, wheezing, painful breathing, coughing up blood.    "  Sleep: per HPI  Gastrointestinal: Denies  difficulty swallowing, nausea, abdominal pain, diarrhea, constipation, heartburn.  Genitourinary: Denies  blood in urine, discharge, frequent urination.   Musculoskeletal: Denies painful joints, sore muscles, back pain.   Integumentary: Denies rashes, lumps, color changes.   Neurological: Denies frequent headaches,weakness, dizziness.    PHYSICAL EXAM      /68 (BP Location: Left arm, Patient Position: Sitting, BP Cuff Size: Adult)   Pulse 95   Resp 16   Ht 1.676 m (5' 6\")   Wt 118 kg (261 lb)   SpO2 88%   BMI 42.13 kg/m²   Appearance: Well-nourished, well-developed, no acute distress, looks stated age  Eyes:  PERRLA, EOMI  ENMT: masked  Respiratory effort:  No intercostal retractions or use of accessory muscles  Lung auscultation:  No wheezes rhonchi rubs or rales  Cardiac: ARELI/VI systolic murmur, RRR    Musculoskeletal:  Grossly normal; gait and station normal;Neurologic: without focal signs; oriented to person, time, place, and purpose; judgement intact  Psychiatric:  No depression, agitation  Mildly anxious        Medical Decision Making:  The medical record was reviewed in its entirety including the referral notes, records from primary care, consultants notes, hospital records, labs, imaging, microbiology, immunology, and immunizations.  Care gaps identified and reviewed with the patient.    Diagnostic and titration nocturnal polysomnograms, home sleep apnea tests, continuous nocturnal oximetry results, multiple sleep latency tests, and compliance reports reviewed.        1. Sleep-related breathing disorder  - zolpidem (AMBIEN) 5 MG Tab; Take 1 tablet by mouth at bedtime as needed for Sleep for up to 1 day.  Dispense: 2 tablet; Refill: 0  - Polysomnography, 4 or More; Future    2. Obesity without serious comorbidity, unspecified classification, unspecified obesity type  - zolpidem (AMBIEN) 5 MG Tab; Take 1 tablet by mouth at bedtime as needed for Sleep for " up to 1 day.  Dispense: 2 tablet; Refill: 0  - Polysomnography, 4 or More; Future    3. Heart disease (organic)  - zolpidem (AMBIEN) 5 MG Tab; Take 1 tablet by mouth at bedtime as needed for Sleep for up to 1 day.  Dispense: 2 tablet; Refill: 0  - Polysomnography, 4 or More; Future    4. SVT (supraventricular tachycardia) (HCC)  - zolpidem (AMBIEN) 5 MG Tab; Take 1 tablet by mouth at bedtime as needed for Sleep for up to 1 day.  Dispense: 2 tablet; Refill: 0  - Polysomnography, 4 or More; Future      PLAN:   The patient has signs and symptoms consistent with obstructive sleep apnea hypopnea syndrome. Will schedule a nocturnal polysomnogram or a home sleep apnea test depending on signs and symptoms as well as insurance restrictions.    The risks of untreated sleep apnea were discussed with the patient at length. Patients with VIJI are at increased risk of cardiovascular disease including coronary artery disease, systemic arterial hypertension, pulmonary arterial hypertension, cardiac arrhythmias, and stroke. VIJI patients have an increased risk of motor vehicle accidents, type 2 diabetes, chronic kidney disease, and non-alcoholic liver disease. The patient was advised to avoid driving a motor vehicle when drowsy.    Positive airway pressure will favorably impact many of the adverse conditions and effects provoked by VIJI.    Also a sleep test to rule out sleep apnea is the first step in a full evaluation for insomnia.    Have advised the patient to follow up with the appropriate healthcare practitioners for all other medical problems and issues.    Mask wearing, handwashing, and social distancing protocols reviewed and encouraged.          Return in about 4 weeks (around 6/2/2021) for For study results, more than 5 days after study, Compliance check.                            Electronically signed by Lucy Thomas M.D.  on 05/05/21    Vero Lieberman A.P.R.NSeferino  [address] [address]  Via  Fax: [phone]     Joshua Sanchez M.D.  [address]  [address]  [facility] [address]  Via In Basket

## 2021-05-05 NOTE — PROGRESS NOTES
"CC: \"At the end of April I started having chest pain and a strange rhythm in my chest and I was admitted here on April 29, 2021 and I was just discharged last Saturday.  The cardiology doctor wanted me to be seen for sleep apnea.\"    HPI:  Anel Schaeffer is a 71 y.o. female kindly referred by COSTA Gates. She had been admitted to [facility] for April 29, 2021 chief complaint of chest pain.  She was having intermittent episodes of nonexertional pressure-like chest discomfort lasting for 1 to 2 minutes without associated symptoms.  She was hospitalized and then seen by Dr. Sanchez, a  cardiology at [facility].  He suggested that she follow-up with a sleep study because she had developed heart arrhythmia, SVT.  She also has the following comorbid conditions hypertension, mixed lipidemia, recent onset of palpitations, and has a systolic heart murmur of unclear medical significance..      Symptom Summary:  Snoring: +  Very loud snoring:-  Witnessed apneas: +  Resuscitative snorts: +  Nocturnal shortness of breath: -  Non-restorative sleep: +  Insomnia:  (Takes about 1 hour to fall asleep)  Nocturnal awakenings: +  Nocturia: +  Fatigue: +  Sleepiness: -  Falls asleep accidentally: -   Napping or returning to bed after arising: +  Restless legs: -  Limb movements during sleep: -  Nocturnal headaches: -    Significant comorbidities and modifying factors include: Hypertension, SVT, undiagnosed heart murmur, and mixed lipidemia. .      Patient Active Problem List    Diagnosis Date Noted   • SVT (supraventricular tachycardia) (HCC) 04/29/2021   • Insomnia 06/18/2014   • Depression 06/18/2014       Past Medical History:   Diagnosis Date   • Hypertension     off meds since 2012        Past Surgical History:   Procedure Laterality Date   • ABDOMINAL HYSTERECTOMY TOTAL      1989   • APPENDECTOMY      1966   • HUMERUS ORIF      3/2014  - right   • LIPOMA EXCISION      left leg   • SHOULDER ARTHROSCOPY      right - " bone spurs.  Lew Barton - [facility]   • TONSILLECTOMY      1956   • CHENCHO BLACK      left - 3/2014 -        Family History   Problem Relation Age of Onset   • Cancer Mother         throat  - smoker   • Other Father         My. Gravis   • Cancer Sister         ovarian - age 67   • Cancer Sister         melanoma       Social History     Socioeconomic History   • Marital status:      Spouse name: Not on file   • Number of children: Not on file   • Years of education: Not on file   • Highest education level: Not on file   Occupational History   • Not on file   Tobacco Use   • Smoking status: Never Smoker   • Smokeless tobacco: Never Used   Substance and Sexual Activity   • Alcohol use: Yes     Comment: socially    • Drug use: Yes     Frequency: 7.0 times per week     Types: Marijuana, Oral, Inhaled     Comment: for sleep    • Sexual activity: Yes     Birth control/protection: Surgical     Comment: ; two boys; wk:  [employer]   Other Topics Concern   • Not on file   Social History Narrative   • Not on file     Social Determinants of Health     Financial Resource Strain:    • Difficulty of Paying Living Expenses:    Food Insecurity:    • Worried About Running Out of Food in the Last Year:    • Ran Out of Food in the Last Year:    Transportation Needs:    • Lack of Transportation (Medical):    • Lack of Transportation (Non-Medical):    Physical Activity:    • Days of Exercise per Week:    • Minutes of Exercise per Session:    Stress:    • Feeling of Stress :    Social Connections:    • Frequency of Communication with Friends and Family:    • Frequency of Social Gatherings with Friends and Family:    • Attends Sabianism Services:    • Active Member of Clubs or Organizations:    • Attends Club or Organization Meetings:    • Marital Status:    Intimate Partner Violence:    • Fear of Current or Ex-Partner:    • Emotionally Abused:    • Physically Abused:    • Sexually Abused:        Current  Outpatient Medications   Medication Sig Dispense Refill   • zolpidem (AMBIEN) 5 MG Tab Take 1 tablet by mouth at bedtime as needed for Sleep for up to 1 day. 2 tablet 0   • losartan (COZAAR) 50 MG Tab Take 1.5 Tablets by mouth every day for 60 days. 90 tablet 1   • spironolactone (ALDACTONE) 25 MG Tab Take 1 tablet by mouth every day for 60 days. 60 tablet 1   • atorvastatin (LIPITOR) 10 MG Tab Take 10 mg by mouth.     • cetirizine (ZYRTEC ALLERGY) 10 MG Tab Take 10 mg by mouth every day.     • aspirin (ASA) 81 MG Chew Tab chewable tablet Chew 81 mg every evening.     • ascorbic acid (VITAMIN C) 500 MG tablet Take 1,000 mg by mouth every day.     • Cholecalciferol (VITAMIN D3 PO) Take 1 Each by mouth every day.     • ZINC SULFATE PO Take 220 mg by mouth every day.       No current facility-administered medications for this visit.       ALLERGIES: Patient has no known allergies.    ROS    Constitutional: Denies fever, chills, sweats,  weight loss, fatigue.  Eyes: Denies vision loss, pain, drainage, double vision, glasses.  Ears/Nose/Mouth/Throat: Denies earache, difficulty hearing, rhinitis/nasal congestion, injury, recurrent sore throat, persistent hoarseness, decayed teeth/toothaches, ringing or buzzing in the ears.  Cardiovascular: Denies chest pain, tightness, palpitations, swelling in legs/feet, fainting, difficulty breathing when lying down but gets better when sitting up.   Respiratory: Denies shortness of breath, cough, sputum, wheezing, painful breathing, coughing up blood.   Sleep: per HPI  Gastrointestinal: Denies  difficulty swallowing, nausea, abdominal pain, diarrhea, constipation, heartburn.  Genitourinary: Denies  blood in urine, discharge, frequent urination.   Musculoskeletal: Denies painful joints, sore muscles, back pain.   Integumentary: Denies rashes, lumps, color changes.   Neurological: Denies frequent headaches,weakness, dizziness.    PHYSICAL EXAM      /68 (BP Location: Left arm,  "Patient Position: Sitting, BP Cuff Size: Adult)   Pulse 95   Resp 16   Ht 1.676 m (5' 6\")   Wt 118 kg (261 lb)   SpO2 88%   BMI 42.13 kg/m²   Appearance: Well-nourished, well-developed, no acute distress, looks stated age  Eyes:  PERRLA, EOMI  ENMT: masked  Respiratory effort:  No intercostal retractions or use of accessory muscles  Lung auscultation:  No wheezes rhonchi rubs or rales  Cardiac: ARELI/VI systolic murmur, RRR    Musculoskeletal:  Grossly normal; gait and station normal;Neurologic: without focal signs; oriented to person, time, place, and purpose; judgement intact  Psychiatric:  No depression, agitation  Mildly anxious        Medical Decision Making:  The medical record was reviewed in its entirety including the referral notes, records from primary care, consultants notes, hospital records, labs, imaging, microbiology, immunology, and immunizations.  Care gaps identified and reviewed with the patient.    Diagnostic and titration nocturnal polysomnograms, home sleep apnea tests, continuous nocturnal oximetry results, multiple sleep latency tests, and compliance reports reviewed.        1. Sleep-related breathing disorder  - zolpidem (AMBIEN) 5 MG Tab; Take 1 tablet by mouth at bedtime as needed for Sleep for up to 1 day.  Dispense: 2 tablet; Refill: 0  - Polysomnography, 4 or More; Future    2. Obesity without serious comorbidity, unspecified classification, unspecified obesity type  - zolpidem (AMBIEN) 5 MG Tab; Take 1 tablet by mouth at bedtime as needed for Sleep for up to 1 day.  Dispense: 2 tablet; Refill: 0  - Polysomnography, 4 or More; Future    3. Heart disease (organic)  - zolpidem (AMBIEN) 5 MG Tab; Take 1 tablet by mouth at bedtime as needed for Sleep for up to 1 day.  Dispense: 2 tablet; Refill: 0  - Polysomnography, 4 or More; Future    4. SVT (supraventricular tachycardia) (HCC)  - zolpidem (AMBIEN) 5 MG Tab; Take 1 tablet by mouth at bedtime as needed for Sleep for up to 1 day.  " Dispense: 2 tablet; Refill: 0  - Polysomnography, 4 or More; Future      PLAN:   The patient has signs and symptoms consistent with obstructive sleep apnea hypopnea syndrome. Will schedule a nocturnal polysomnogram or a home sleep apnea test depending on signs and symptoms as well as insurance restrictions.    The risks of untreated sleep apnea were discussed with the patient at length. Patients with VIJI are at increased risk of cardiovascular disease including coronary artery disease, systemic arterial hypertension, pulmonary arterial hypertension, cardiac arrhythmias, and stroke. VIJI patients have an increased risk of motor vehicle accidents, type 2 diabetes, chronic kidney disease, and non-alcoholic liver disease. The patient was advised to avoid driving a motor vehicle when drowsy.    Positive airway pressure will favorably impact many of the adverse conditions and effects provoked by VIJI.    Also a sleep test to rule out sleep apnea is the first step in a full evaluation for insomnia.    Have advised the patient to follow up with the appropriate healthcare practitioners for all other medical problems and issues.    Mask wearing, handwashing, and social distancing protocols reviewed and encouraged.          Return in about 4 weeks (around 6/2/2021) for For study results, more than 5 days after study, Compliance check.

## 2021-05-09 ENCOUNTER — SLEEP STUDY (OUTPATIENT)
Dept: SLEEP MEDICINE | Facility: MEDICAL CENTER | Age: 71
End: 2021-05-09
Attending: PREVENTIVE MEDICINE
Payer: MEDICARE

## 2021-05-09 DIAGNOSIS — I51.9 HEART DISEASE (ORGANIC): ICD-10-CM

## 2021-05-09 DIAGNOSIS — E66.9 OBESITY WITHOUT SERIOUS COMORBIDITY, UNSPECIFIED CLASSIFICATION, UNSPECIFIED OBESITY TYPE: ICD-10-CM

## 2021-05-09 DIAGNOSIS — G47.30 SLEEP-RELATED BREATHING DISORDER: ICD-10-CM

## 2021-05-09 DIAGNOSIS — I47.10 SVT (SUPRAVENTRICULAR TACHYCARDIA) (HCC): ICD-10-CM

## 2021-05-10 NOTE — PROCEDURES
Narrative prepared by Dr. Maranda Thomas      RECORDING TECHNIQUE:       After the scalp was prepared, gold plated electrodes were applied to the scalp according to the International 10-20 System. EEG (electroencephalogram) was continuously monitored from the O1-M2, O2-M1, C3-M2, C4-M1, F3-M2, and F4-M1.   EOGs (electrooculograms) were monitored by electrodes placed at the left and right outer canthi.  Chin EMG (electromyogram) was monitored by electrodes placed on the mentalis and sub-mentalis muscles.  Nasal and oral airflow were monitored using a triple port thermocouple as well as oronasal pressure transducer.  Respiratory effort was measured by inductive plethysmography technology employing abdominal and thoracic belts.  Blood oxygen saturation and pulse were monitored by pulse oximetry.  Heart rhythm was monitored by surface electrocardiogram.  Leg EMG was studied using surface electrodes placed on left and right anterior tibialis.  A microphone was used to monitor tracheal sounds and snoring.  Body position was monitored and documented by technician observation.    Comments:  The patient underwent a diagnostic polysomnogram using the standard montage for measurement of parameters of sleep, respiratory events, movement abnormalities, and heart rate and rhythm.   A microphone was used to monitor snoring.  DATA:  Study start time was 10:20:48 PM. Diagnostic recording time was 7h 53.5m with a total sleep time of 6h 28.0m resulting in a sleep efficiency of 81.94%%.   Sleep latency from the start of the study was 29 minutes and the latency from sleep to REM was 183 minutes.  In total,60 arousals were scored for an arousal index of 9.3.  Respiratory:  There were a total of 1 apneas consisting of 1 obstructive apneas, 0 mixed apneas, and 0 central apneas. A total of 88 hypopneas were scored.  The apnea index was 0.15 per hour and the hypopnea index was 13.61 per hour resulting in an overall AHI of 13.76.  AHI during  REM was 60.0 and AHI while supine was 0.00.  Oximetry:  There was a mean oxygen saturation of 79.0% with a minimum oxygen saturation of 50.0%. Time spent with oxygen saturations below 89% was 440.2 minutes.  Cardiac:  The highest heart rate seen while awake was 113 BPM while the highest heart rate during sleep was 101 BPM with an average sleeping heart rate of 84 BPM.  Limb Movements:  There were a total of 62 PLMs during sleep, of which 7 were PLMS arousals. This resulted in a PLMS index of 9.6 and a PLMS arousal index of 1.1.    SUMMARY -- KEY Observation:    This was an overnight diagnostic polysomnogram with a possible subsequent positive airway pressure titration.  However, the patient did not meet the split-night protocol at the standard two hour kike.    The total recording time was 7 hr 53 min minutes, the sleep period time was 6hr 28 min minutes.  The patient's sleep efficiency was 81.94 % which is acceptable.  The patient experienced  8  REM period(s).  However her REM onset was at 183 minutes.  This exceeded the 2-hour diagnostic  observation.  It is important to note and it is evidenced on the HYPNOGRAM below,that at the majority of this patient's obstructive breathing events occurred REM sleep stages along with the most severe O2 desaturation events.   Although her overall AHI is 13.76 the AHI during REM stage sleep was 60.0  One other notable finding is that the AHI in the supine position was 0.0.    The amina saturation during sleep was 50 % and the patient spent 440.2  % of the recording with saturations less than or equal to 89%.    During sleep, the minimum heart rate was 74 BPM, the mean heart rate was 84 BPM, and the maximum heart rate was 101 BPM.      ASSESSMENT:    1)Moderate Obstructive sleep apnea hypopnea - Overall AHI of  13.76.  However, this patient who is female and  in her 70's,  also demonstrates a  frequently observed REM stage sleep phenomenon.  Although this is NOT a formal  diagnosis,  this is where there is a significant increase in obstructive events seen primarily in  REM stage sleep and accompanied by a significant oxygen desaturation.  These observations are usually independent of body position for sleep as is seen in this patient.    2)Nocturnal desaturation - amina saturation 50% with a total time spent with an O2 under <89% of 440.2 minutes.  This is a significant finding.  It is quite possible that simply treating the obstructive sleep apnea will treat the nocturnal hypoxia.  If not then the patient may require an O2 bleed in at night.        RECOMMENDATION:    Patient will undergo APAP empiric titration with the minimum pressure of 6 and a maximum pressure of 16.  A new APAP set up will be ordered for her through the DME of her choice.  Once the patient is compliant and stable using her Pap Therapy an additional overnight oximetry will be done while she is using  her  Pap therapy to determine whether or not the hypoxia has resolved.   If it is not resolved oxygen therapy in addition to Pap therapy may be required.  This approach is very beneficial to the patient because in [facility] there are significant changes in elevation.  What might be observed in the lab would be very different than what is observed in the patient's own home.    The results and recommendations will be discussed with the patient at her follow up visit.        HYPNOGRAM:

## 2021-05-11 PROCEDURE — 95810 POLYSOM 6/> YRS 4/> PARAM: CPT | Performed by: PREVENTIVE MEDICINE

## 2021-05-12 ENCOUNTER — OFFICE VISIT (OUTPATIENT)
Dept: SLEEP MEDICINE | Facility: MEDICAL CENTER | Age: 71
End: 2021-05-12
Payer: MEDICARE

## 2021-05-12 VITALS
OXYGEN SATURATION: 90 % | SYSTOLIC BLOOD PRESSURE: 130 MMHG | HEIGHT: 66 IN | DIASTOLIC BLOOD PRESSURE: 70 MMHG | HEART RATE: 88 BPM | WEIGHT: 261 LBS | RESPIRATION RATE: 16 BRPM | BODY MASS INDEX: 41.95 KG/M2

## 2021-05-12 DIAGNOSIS — G47.33 OSA (OBSTRUCTIVE SLEEP APNEA): ICD-10-CM

## 2021-05-12 DIAGNOSIS — E66.01 CLASS 3 SEVERE OBESITY DUE TO EXCESS CALORIES WITHOUT SERIOUS COMORBIDITY IN ADULT, UNSPECIFIED BMI (HCC): ICD-10-CM

## 2021-05-12 PROCEDURE — 99214 OFFICE O/P EST MOD 30 MIN: CPT | Performed by: PREVENTIVE MEDICINE

## 2021-05-12 ASSESSMENT — FIBROSIS 4 INDEX: FIB4 SCORE: 1.01

## 2021-05-12 NOTE — PROGRESS NOTES
"CC:  \" Nothing has changed. I just want to know the results of my sleep study.\"        HPI:  Anel Schaeffer is a 71 y.o.female  kindly referred by COSTA Gates.  She returns today for her sleep results. Her co-morbid conditions include, SVT, Obesity, hypertension, mixed lipidemia.  The  patient underwent a diagnostic polysomnogram study on May 5, 2021. She was scheduled as a split study but did not meet criteria. The study results are as follows.    Diagnostic polysomnogram: May 5, 2020  Total recording time was 7 hours and 53 minutes, sleep. Time was 6 hours and 28 minutes.  REM onset was at 183 minutes.  Overall AHI was 13.76.  REM AHI was 60.0  Oxygen amina was 50%. Patient spent 440.2 minutes under an O2 of 89%    IMPRESSION:  Overall: Mild VIJI, however in REM age sleep this patient has severe VIJI with an AHI of 60.0  There was no positional component to her sleep apnea.  Patient exhibited severe oxygen desaturation during REM stage sleep.      Patient Active Problem List    Diagnosis Date Noted   • SVT (supraventricular tachycardia) ([facility]) 04/29/2021   • Insomnia 06/18/2014   • Depression 06/18/2014       Past Medical History:   Diagnosis Date   • Hypertension     off meds since 2012        Past Surgical History:   Procedure Laterality Date   • ABDOMINAL HYSTERECTOMY TOTAL      1989   • APPENDECTOMY      1966   • HUMERUS ORIF      3/2014  - right   • LIPOMA EXCISION      left leg   • SHOULDER ARTHROSCOPY      right - bone spurs.  Lew Barton - ortho   • TONSILLECTOMY      1956   • ULNA ORIF      left - 3/2014 -        Family History   Problem Relation Age of Onset   • Cancer Mother         throat  - smoker   • Other Father         My. Gravis   • Cancer Sister         ovarian - age 67   • Cancer Sister         melanoma       Social History     Socioeconomic History   • Marital status:      Spouse name: Not on file   • Number of children: Not on file   • Years of education: Not on " file   • Highest education level: Not on file   Occupational History   • Not on file   Tobacco Use   • Smoking status: Never Smoker   • Smokeless tobacco: Never Used   Substance and Sexual Activity   • Alcohol use: Yes     Comment: socially    • Drug use: Yes     Frequency: 7.0 times per week     Types: Marijuana, Oral, Inhaled     Comment: for sleep    • Sexual activity: Yes     Birth control/protection: Surgical     Comment: ; two boys; wk:  [employer]   Other Topics Concern   • Not on file   Social History Narrative   • Not on file     Social Determinants of Health     Financial Resource Strain:    • Difficulty of Paying Living Expenses:    Food Insecurity:    • Worried About Running Out of Food in the Last Year:    • Ran Out of Food in the Last Year:    Transportation Needs:    • Lack of Transportation (Medical):    • Lack of Transportation (Non-Medical):    Physical Activity:    • Days of Exercise per Week:    • Minutes of Exercise per Session:    Stress:    • Feeling of Stress :    Social Connections:    • Frequency of Communication with Friends and Family:    • Frequency of Social Gatherings with Friends and Family:    • Attends Jew Services:    • Active Member of Clubs or Organizations:    • Attends Club or Organization Meetings:    • Marital Status:    Intimate Partner Violence:    • Fear of Current or Ex-Partner:    • Emotionally Abused:    • Physically Abused:    • Sexually Abused:        Current Outpatient Medications   Medication Sig Dispense Refill   • losartan (COZAAR) 50 MG Tab Take 1.5 Tablets by mouth every day for 60 days. 90 tablet 1   • spironolactone (ALDACTONE) 25 MG Tab Take 1 tablet by mouth every day for 60 days. 60 tablet 1   • atorvastatin (LIPITOR) 10 MG Tab Take 10 mg by mouth.     • cetirizine (ZYRTEC ALLERGY) 10 MG Tab Take 10 mg by mouth every day.     • aspirin (ASA) 81 MG Chew Tab chewable tablet Chew 81 mg every evening.     • ascorbic acid (VITAMIN C) 500  "MG tablet Take 1,000 mg by mouth every day.     • Cholecalciferol (VITAMIN D3 PO) Take 1 Each by mouth every day.     • ZINC SULFATE PO Take 220 mg by mouth every day.       No current facility-administered medications for this visit.    \"CURRENT RX\"    ALLERGIES: Patient has no known allergies.    ROS    Constitutional: Denies  weight loss, reports  fatigue  Cardiovascular: Denies chest pain, tightness, palpitations, swelling in legs/feet, difficulty breathing when lying down but gets better when sitting up.   Respiratory: Denies shortness of breath during the day  Sleep: per HPI  Gastrointestinal: Denies  difficulty swallowing, heartburn.  Musculoskeletal: Denies painful joints, sore muscles, back pain.        PHYSICAL EXAM      /70 (BP Location: Left arm, Patient Position: Sitting, BP Cuff Size: Large adult)   Pulse 88   Resp 16   Ht 1.676 m (5' 6\")   Wt 118 kg (261 lb)   SpO2 90%   BMI 42.13 kg/m²      Appearance: Well-nourished, well-developed, no acute distress  Eyes:  PERRLA, EOMI  ENMT: masked  Neck: Supple, trachea midline, no masses  Respiratory effort:  No intercostal retractions or use of accessory muscles  Lung auscultation:  No wheezes rhonchi rubs or rales  Cardiac: No murmurs, rubs, or gallops; regular rhythm, normal rate; no edema  Musculoskeletal:  Grossly normal; gait and station normal; digits and nails normal  Skin:  No cyanosis  Neurologic: without focal signs; oriented to person, time, place, and purpose; judgement intact  Psychiatric:  Mild  Anxiety about the use of Pap tx.      Medical Decision Making       The medical record was reviewed in its entirety including the referral notes, records from primary care, consultants notes, hospital records, lab, imaging, microbiology, immunology, and immunizations.  Care gaps identified and reviewed with the patient.    Diagnostic and titration nocturnal polysomnogram's, home sleep apnea tests, continuous nocturnal oximetry results, " multiple sleep latency tests, and compliance reports reviewed.    1. VIJI (obstructive sleep apnea)  - DME CPAP    2. Class 3 severe obesity due to excess calories without serious comorbidity in adult, unspecified BMI (HCC)  - DME CPAP      PLAN:   MILD to severe VIJI:  Patient will undergo APAP empiric titration with the minimum pressure of 6 and a maximum pressure of 16.  A new APAP set up will be ordered for her through the DME of her choice. Mask per pt's choice. RESMED APAP new setup requested.  Once the patient is compliant and stable using her Pap Therapy an additional overnight oximetry will be done while she is using  her  Pap therapy to determine whether or not the hypoxia has resolved.   If it is not resolved oxygen therapy in addition to Pap therapy may be required.  This approach is very beneficial to the patient because in [facility] there are significant changes in elevation.  What might be observed in the lab may be very different than what is observed in the patient's own home    The risks of untreated sleep apnea were discussed with the patient at length. Patients with VIJI are at increased risk of cardiovascular disease including coronary artery disease, systemic arterial hypertension, pulmonary arterial hypertension, cardiac arrhythmias, and stroke. VIJI patients have an increased risk of motor vehicle accidents, type 2 diabetes, chronic kidney disease, and non-alcoholic liver disease. The patient was advised to avoid driving a motor vehicle when drowsy.    Positive airway pressure will favorably impact many of the adverse conditions and effects provoked by VIJI.    Have advised the patient to follow up with the appropriate healthcare practitioners for all other medical problems and issues.    Mask wearing, handwashing, and social distancing protocols reviewed and encouraged.    Return in about 7 weeks (around 6/30/2021) for Virtual visit for results, Virtual Visit for Cmpliance  check.

## 2021-05-17 ENCOUNTER — OFFICE VISIT (OUTPATIENT)
Dept: CARDIOLOGY | Facility: MEDICAL CENTER | Age: 71
End: 2021-05-17
Payer: MEDICARE

## 2021-05-17 VITALS
BODY MASS INDEX: 44.32 KG/M2 | OXYGEN SATURATION: 93 % | RESPIRATION RATE: 16 BRPM | SYSTOLIC BLOOD PRESSURE: 128 MMHG | DIASTOLIC BLOOD PRESSURE: 62 MMHG | HEART RATE: 78 BPM | WEIGHT: 266 LBS | HEIGHT: 65 IN

## 2021-05-17 DIAGNOSIS — G47.33 OBSTRUCTIVE SLEEP APNEA SYNDROME: ICD-10-CM

## 2021-05-17 DIAGNOSIS — Z79.899 HIGH RISK MEDICATION USE: ICD-10-CM

## 2021-05-17 DIAGNOSIS — I10 HTN (HYPERTENSION), MALIGNANT: ICD-10-CM

## 2021-05-17 DIAGNOSIS — I35.0 AORTIC STENOSIS, MODERATE: ICD-10-CM

## 2021-05-17 DIAGNOSIS — E78.2 MIXED HYPERLIPIDEMIA: ICD-10-CM

## 2021-05-17 DIAGNOSIS — E66.01 MORBID OBESITY WITH BMI OF 40.0-44.9, ADULT (HCC): ICD-10-CM

## 2021-05-17 PROCEDURE — 99214 OFFICE O/P EST MOD 30 MIN: CPT | Performed by: NURSE PRACTITIONER

## 2021-05-17 RX ORDER — LOSARTAN POTASSIUM 50 MG/1
75 TABLET ORAL
Qty: 135 TABLET | Refills: 3 | Status: SHIPPED | OUTPATIENT
Start: 2021-05-17 | End: 2021-10-15 | Stop reason: DRUGHIGH

## 2021-05-17 RX ORDER — ZOLPIDEM TARTRATE 5 MG/1
TABLET ORAL
COMMUNITY
Start: 2021-05-07 | End: 2021-05-17

## 2021-05-17 RX ORDER — SPIRONOLACTONE 25 MG/1
25 TABLET ORAL DAILY
Qty: 90 TABLET | Refills: 3 | Status: SHIPPED | OUTPATIENT
Start: 2021-05-17 | End: 2022-05-27

## 2021-05-17 RX ORDER — OMEPRAZOLE 20 MG/1
20 CAPSULE, DELAYED RELEASE ORAL DAILY
COMMUNITY

## 2021-05-17 ASSESSMENT — ENCOUNTER SYMPTOMS
FEVER: 0
SHORTNESS OF BREATH: 0
COUGH: 1
DIZZINESS: 0
PND: 0
ABDOMINAL PAIN: 0
ORTHOPNEA: 0
MYALGIAS: 0
CLAUDICATION: 0
PALPITATIONS: 0

## 2021-05-17 ASSESSMENT — FIBROSIS 4 INDEX: FIB4 SCORE: 1.01

## 2021-05-17 NOTE — PROGRESS NOTES
"Chief Complaint   Patient presents with   • Palpitations       Subjective:   Anel Schaeffer is a 71 y.o. female who presents today for follow up on her recent hospitalization with her , Sonny.    Patient of Dr. Sanchez.  Patient was last seen in clinic on 4/29/2021.  During that visit, she was sent to the ER for her chest pain and concern for unstable angina.    Patient was admitted from 4/29/2021 through 5/1/2021.  Initially, patient was found to be hypertensive.  Echocardiogram was performed which showed moderate aortic stenosis.  She did have a cardiac catheterization on 5 1 which showed no evidence of CAD but showed moderate aortic stenosis.  Her blood pressure was treated.    Patient has since had a follow-up with sleep medicine and plans to start APAP.    Patient has been feeling fairly well aside from feelings of \"grogginess\" when she wakes up in the morning.  She otherwise denies chest pain, palpitations, orthopnea, PND, edema, shortness of breath or dizziness/lightheadedness.  She does report having cough at night or with allergies.    Pt has been taking Omeprazole for her GERD symptoms and has not had any more symptoms.    Patient reports her home blood pressures have been stable in the 120s, systolic.  Patient denies any tachycardia episodes    She plans on losing weight.    Patient is planning an RV trip in 2 weeks.  She will be back in town 1 week in July and 2 weeks in October.    Additonally, patient has the following medical problems:    -Hypertension    -Hyperlipidemia    -Environmental allergies    Past Medical History:   Diagnosis Date   • Hypertension     off meds since 2012     Past Surgical History:   Procedure Laterality Date   • ABDOMINAL HYSTERECTOMY TOTAL      1989   • APPENDECTOMY      1966   • HUMERUS ORIF      3/2014  - right   • LIPOMA EXCISION      left leg   • SHOULDER ARTHROSCOPY      right - bone spurs.  [facility] - ortho   • TONSILLECTOMY      1956   • ULNA ORIF      left - " 3/2014 -      Family History   Problem Relation Age of Onset   • Cancer Mother         throat  - smoker   • Other Father         My. Gravis   • Cancer Sister         ovarian - age 67   • Cancer Sister         melanoma     Social History     Socioeconomic History   • Marital status:      Spouse name: Not on file   • Number of children: Not on file   • Years of education: Not on file   • Highest education level: Not on file   Occupational History   • Not on file   Tobacco Use   • Smoking status: Never Smoker   • Smokeless tobacco: Never Used   Vaping Use   • Vaping Use: Never used   Substance and Sexual Activity   • Alcohol use: Yes     Comment: socially    • Drug use: Yes     Frequency: 7.0 times per week     Types: Marijuana, Oral, Inhaled     Comment: for sleep    • Sexual activity: Yes     Birth control/protection: Surgical     Comment: ; two boys; wk:  [employer]   Other Topics Concern   • Not on file   Social History Narrative   • Not on file     Social Determinants of Health     Financial Resource Strain:    • Difficulty of Paying Living Expenses:    Food Insecurity:    • Worried About Running Out of Food in the Last Year:    • Ran Out of Food in the Last Year:    Transportation Needs:    • Lack of Transportation (Medical):    • Lack of Transportation (Non-Medical):    Physical Activity:    • Days of Exercise per Week:    • Minutes of Exercise per Session:    Stress:    • Feeling of Stress :    Social Connections:    • Frequency of Communication with Friends and Family:    • Frequency of Social Gatherings with Friends and Family:    • Attends [employer] Services:    • Active Member of Clubs or Organizations:    • Attends Club or Organization Meetings:    • Marital Status:    Intimate Partner Violence:    • Fear of Current or Ex-Partner:    • Emotionally Abused:    • Physically Abused:    • Sexually Abused:      No Known Allergies  Outpatient Encounter Medications as of  "5/17/2021   Medication Sig Dispense Refill   • spironolactone (ALDACTONE) 25 MG Tab Take 1 tablet by mouth every day. 90 tablet 3   • losartan (COZAAR) 50 MG Tab Take 1.5 Tablets by mouth every day. 135 tablet 3   • omeprazole (PRILOSEC) 20 MG delayed-release capsule Take 20 mg by mouth every day.     • atorvastatin (LIPITOR) 10 MG Tab Take 10 mg by mouth.     • cetirizine (ZYRTEC ALLERGY) 10 MG Tab Take 10 mg by mouth every day.     • aspirin (ASA) 81 MG Chew Tab chewable tablet Chew 81 mg every evening.     • ascorbic acid (VITAMIN C) 500 MG tablet Take 1,000 mg by mouth every day.     • Cholecalciferol (VITAMIN D3 PO) Take 1 Each by mouth every day.     • ZINC SULFATE PO Take 220 mg by mouth every day.     • [DISCONTINUED] zolpidem (AMBIEN) 5 MG Tab TAKE 1 TABLET BY MOUTH AT BEDTIME AS NEEDED FOR SLEEP FOR UP TO 1 DAY (Patient not taking: Reported on 5/17/2021)     • [DISCONTINUED] losartan (COZAAR) 50 MG Tab Take 1.5 Tablets by mouth every day for 60 days. 90 tablet 1   • [DISCONTINUED] spironolactone (ALDACTONE) 25 MG Tab Take 1 tablet by mouth every day for 60 days. 60 tablet 1     No facility-administered encounter medications on file as of 5/17/2021.     Review of Systems   Constitutional: Positive for malaise/fatigue (\"groggy\"). Negative for fever.   Respiratory: Positive for cough. Negative for shortness of breath.    Cardiovascular: Negative for chest pain, palpitations, orthopnea, claudication, leg swelling and PND.   Gastrointestinal: Negative for abdominal pain.   Musculoskeletal: Negative for myalgias.   Neurological: Negative for dizziness.   All other systems reviewed and are negative.       Objective:   /62 (BP Location: Left arm, Patient Position: Supine)   Pulse 78   Resp 16   Ht 1.651 m (5' 5\")   Wt 121 kg (266 lb)   SpO2 93%   BMI 44.26 kg/m²     Physical Exam   Constitutional: She is oriented to person, place, and time. She appears well-developed.   HENT:   Head: Normocephalic and " atraumatic.   Eyes: Pupils are equal, round, and reactive to light.   Neck: No JVD present.   Cardiovascular: Normal rate and regular rhythm.   Murmur heard.  Pulmonary/Chest: Effort normal and breath sounds normal. No respiratory distress. She has no wheezes. She has no rales.   Abdominal: Soft. Bowel sounds are normal.   Musculoskeletal:      Cervical back: Normal range of motion and neck supple.   Neurological: She is alert and oriented to person, place, and time.   Skin: Skin is warm and dry.   Psychiatric: Her behavior is normal.   Vitals reviewed.    Lab Results   Component Value Date/Time    CHOLSTRLTOT 159 04/29/2021 05:07 PM    LDL 81 04/29/2021 05:07 PM    HDL 65 04/29/2021 05:07 PM    TRIGLYCERIDE 64 04/29/2021 05:07 PM       Lab Results   Component Value Date/Time    SODIUM 139 05/01/2021 01:45 AM    POTASSIUM 4.0 05/01/2021 01:45 AM    CHLORIDE 106 05/01/2021 01:45 AM    CO2 25 05/01/2021 01:45 AM    GLUCOSE 97 05/01/2021 01:45 AM    BUN 15 05/01/2021 01:45 AM    CREATININE 0.59 05/01/2021 01:45 AM     Lab Results   Component Value Date/Time    ALKPHOSPHAT 81 05/01/2021 01:45 AM    ASTSGOT 11 (L) 05/01/2021 01:45 AM    ALTSGPT 11 05/01/2021 01:45 AM    TBILIRUBIN 0.3 05/01/2021 01:45 AM      Transthoracic Echo Report 4/29/2021  No prior study is available for comparison.   Technically difficult study - adequate information is obtained.   Normal left ventricular systolic function.  Left ventricular ejection fraction is visually estimated to be 70%.  Normal regional wall motion.  Contrast was used to enhance visualization of the endocardial border.  Normal right ventricular size and systolic function.  Mild mitral annular calcification.  No mitral regurgitation.  Moderate aortic stenosis.  Transvalvular gradients are - Peak: 36 mmHg, Mean: 26 mmHg.  Normal pericardium without effusion.  Normal aortic root for body surface area.    Cardiac catheterization 05/01/2021      REFERRING PHYSICIAN:  Joshua  OLEG Sanchez MD     PROCEDURES:  1.  Left heart catheterization.  2.  Coronary angiography.  3.  Left ventriculogram.  4.  Ascending aortogram.  5.  Descending aortogram.  6.  Monitor conscious sedation.     PREPROCEDURE DIAGNOSES:  1.  Chest pain.  2.  Aortic stenosis.     POSTPROCEDURE DIAGNOSES:  1.  No angiographic evidence of coronary artery disease.  2.  Normal left ventricular systolic function with ejection fraction of 65%.  3.  Elevated left ventricular end diastolic pressure.  4.  Aortic stenosis with peak-to-peak gradient of 40 mmHg.     Assessment:     1. High risk medication use  Basic Metabolic Panel   2. HTN (hypertension), malignant  Basic Metabolic Panel   3. Mixed hyperlipidemia     4. Aortic stenosis, moderate     5. Obstructive sleep apnea syndrome     6. Morbid obesity with BMI of 40.0-44.9, adult ([facility])         Medical Decision Making:  Today's Assessment / Status / Plan:   1.  Aortic stenosis: Moderate per echo and heart cath  -Will follow  -Patient currently asymptomatic  -Will consider referral to structural heart program once patient back in town    2.  Hypertension: Stable  -Continue losartan 75 mg daily  -Continue spironolactone 25 mg daily  -Obtain a BMP in 1 to 2 weeks  -Monitor and log Blood pressures at home. Call office or RTC if BP increasing or >180/100 or if symptoms of elevated blood pressure present. Reviewed s/sx of stroke and heart attack. Patient to go to ER or call 911 if present.     3.  Hyperlipidemia:  - Last LDL 81 on 4/29/2021  -Continue atorvastatin 10 mg daily  -Continue aspirin 81 mg daily    4.  Sleep apnea: Moderate  -Patient plans on starting APAP with close follow-up with sleep medicine    5.  Obese: BMI 44.26  -Encouraged weight loss     Discussed with patient to try changing regimen for allergies to see if that will help her allergies.    FU in clinic in 5 months with Dr. Sanchez. Sooner if needed.    Patient verbalizes understanding and agrees with the plan of care.      PLEASE NOTE: This Note was created using voice recognition Software. I have made every reasonable attempt to correct obvious errors, but I expect that there are errors of grammar and possibly content that I did not discover before finalizing the note

## 2021-06-03 ENCOUNTER — HOSPITAL ENCOUNTER (OUTPATIENT)
Dept: LAB | Facility: MEDICAL CENTER | Age: 71
End: 2021-06-03
Attending: NURSE PRACTITIONER
Payer: MEDICARE

## 2021-06-03 DIAGNOSIS — I10 HTN (HYPERTENSION), MALIGNANT: ICD-10-CM

## 2021-06-03 DIAGNOSIS — Z79.899 HIGH RISK MEDICATION USE: ICD-10-CM

## 2021-06-03 LAB
ANION GAP SERPL CALC-SCNC: 9 MMOL/L (ref 7–16)
BUN SERPL-MCNC: 16 MG/DL (ref 8–22)
CALCIUM SERPL-MCNC: 9.6 MG/DL (ref 8.5–10.5)
CHLORIDE SERPL-SCNC: 102 MMOL/L (ref 96–112)
CO2 SERPL-SCNC: 27 MMOL/L (ref 20–33)
CREAT SERPL-MCNC: 0.56 MG/DL (ref 0.5–1.4)
GLUCOSE SERPL-MCNC: 99 MG/DL (ref 65–99)
POTASSIUM SERPL-SCNC: 4.4 MMOL/L (ref 3.6–5.5)
SODIUM SERPL-SCNC: 138 MMOL/L (ref 135–145)

## 2021-06-03 PROCEDURE — 36415 COLL VENOUS BLD VENIPUNCTURE: CPT

## 2021-06-03 PROCEDURE — 80048 BASIC METABOLIC PNL TOTAL CA: CPT

## 2021-08-05 ENCOUNTER — APPOINTMENT (OUTPATIENT)
Dept: SLEEP MEDICINE | Facility: MEDICAL CENTER | Age: 71
End: 2021-08-05
Payer: MEDICARE

## 2021-09-24 ENCOUNTER — TELEMEDICINE (OUTPATIENT)
Dept: SLEEP MEDICINE | Facility: MEDICAL CENTER | Age: 71
End: 2021-09-24
Payer: MEDICARE

## 2021-09-24 VITALS — WEIGHT: 256 LBS | HEIGHT: 66 IN | BODY MASS INDEX: 41.14 KG/M2

## 2021-09-24 DIAGNOSIS — G47.33 OSA (OBSTRUCTIVE SLEEP APNEA): ICD-10-CM

## 2021-09-24 PROCEDURE — 99213 OFFICE O/P EST LOW 20 MIN: CPT | Mod: 95 | Performed by: NURSE PRACTITIONER

## 2021-09-24 ASSESSMENT — FIBROSIS 4 INDEX: FIB4 SCORE: 1.01

## 2021-09-24 NOTE — PROGRESS NOTES
Virtual Visit: Established Patient   This visit was conducted via Zoom using secure and encrypted videoconferencing technology. The patient was in a private location in the state [facility].    The patient's identity was confirmed and verbal consent was obtained for this virtual visit.  Given the importance of social distancing and other strategies recommended to reduce the risk of COVID-19 transmission, I am providing medical care to this patient via audio/video visit in place of an in person visit at the request of the patient.  Subjective:   CC:   Chief Complaint   Patient presents with   • Follow-Up     VIJI-Last seen 05/12/2021       Anel Schaeffer is a 71 y.o. female presenting for evaluation and management of VIJI. PMH includes SVT, depression, insomnia, aortic stenosis, VIJI, obesity, never smoker, tonsillectomy, HTN.     Patient was set up with a [employer]Med autoCPAP in July 2021.  First compliance report from 7/5/21-8/3/21 was downloaded and reviewed with the patient which showed autoCPAP 6-16 cmH2O, 90% compliance, 6 hrs 17 min use, AHI of 0.5. Compliance report from 8/24/21-9/22/21 indicated autoCPAP 6-16 cmH2O, 97% compliance, 7 hrs 9 min use, AHI of 0.6. She is tolerating the pressure and mask well. She goes to bed at 11 pm and wakes up at 7 am.  She denies morning headache or snoring.  She reports that since she has been on CPAP therapy she is sleeping and feeling much better.  She no longer feels groggy or lightheaded and also has not experienced tachycardia.  Patient is planning to obtain a flu shot today.  She also reports that she received the Pneumovax second vaccination and will provide us with the date.  She tries to stay active by walking but has had some issues with her hip that have made it more difficult.    Sleep Study History:  PSG study from 5/9/21 indicated moderate Obstructive sleep apnea hypopnea - Overall AHI of  13.76. AHI during REM stage sleep was 60.0  One other notable finding is that  "the AHI in the supine position was 0.0. The amina saturation during sleep was 50 % and the patient spent 440.2  % of the recording with saturations less than or equal to 89%.    ROS   Denies any recent fevers or chills. No nausea or vomiting. No chest pains or shortness of breath.     No Known Allergies    Current medicines (including changes today)  Current Outpatient Medications   Medication Sig Dispense Refill   • spironolactone (ALDACTONE) 25 MG Tab Take 1 tablet by mouth every day. 90 tablet 3   • losartan (COZAAR) 50 MG Tab Take 1.5 Tablets by mouth every day. 135 tablet 3   • omeprazole (PRILOSEC) 20 MG delayed-release capsule Take 20 mg by mouth every day.     • atorvastatin (LIPITOR) 10 MG Tab Take 10 mg by mouth.     • cetirizine (ZYRTEC ALLERGY) 10 MG Tab Take 10 mg by mouth every day.     • aspirin (ASA) 81 MG Chew Tab chewable tablet Chew 81 mg every evening.     • ascorbic acid (VITAMIN C) 500 MG tablet Take 1,000 mg by mouth every day.     • Cholecalciferol (VITAMIN D3 PO) Take 1 Each by mouth every day.     • ZINC SULFATE PO Take 220 mg by mouth every day.       No current facility-administered medications for this visit.       Patient Active Problem List    Diagnosis Date Noted   • Aortic stenosis, moderate 05/17/2021   • SVT (supraventricular tachycardia) (HCC) 04/29/2021   • Insomnia 06/18/2014   • Depression 06/18/2014       Family History   Problem Relation Age of Onset   • Cancer Mother         throat  - smoker   • Other Father         My. Gravis   • Cancer Sister         ovarian - age 67   • Cancer Sister         melanoma       She  has a past medical history of Hypertension. She also has no past medical history of ASTHMA or Diabetes.  She  has a past surgical history that includes humerus orif; ulna orif; abdominal hysterectomy total; appendectomy; tonsillectomy; lipoma excision; and shoulder arthroscopy.       Objective:   Ht 1.676 m (5' 6\")   Wt 116 kg (256 lb)   BMI 41.32 kg/m² "     Physical Exam:  Constitutional: Alert, no distress, well-groomed.  Skin: No rashes in visible areas.  Eye: Round. Conjunctiva clear, lids normal. No icterus.   ENMT: Lips pink without lesions, good dentition, moist mucous membranes. Phonation normal.  Neck: Moves freely without pain.  Respiratory: Unlabored respiratory effort, no cough or audible wheeze  Psych: Alert and oriented x3, normal affect and mood.       Assessment and Plan:   The following treatment plan was discussed:     1. VIJI (obstructive sleep apnea)    2. BMI 40.0-44.9, adult ([facility])      Discussed the cardiovascular and neuropsychiatric risks of untreated VIJI; including but not limited to: HTN, DM, MI, ASCVD, CVA, CHF, traffic accidents.       1. First compliance report from 7/5/21-8/3/21 was downloaded and reviewed with the patient which showed autoCPAP 6-16 cmH2O, 90% compliance, 6 hrs 17 min use, AHI of 0.5. Compliance report from 8/24/21-9/22/21 indicated autoCPAP 6-16 cmH2O, 97% compliance, 7 hrs 9 min use, AHI of 0.6. Continue autoCPAP. Patient is compliant and benefiting from autoCPAP therapy for management of VIJI. Advised patient to continue to use the CPAP every night for more than four hours for optimal health benefit and to meet the health insurance 70% compliance guideline.    *DME order ([facility]) for mask (FFM or MOC) and supplies was not needed today. Continue to clean mask and supplies weekly, and change supplies per insurance guidelines.     *Sleep hygiene was reviewed. Recommend keeping a set sleep/wake schedule. Logging enough hours of sleep. Limiting/Avoiding naps. No caffeine after noon and no heavy meals in the evening.     2. Continue to stay active.   3. Follow up with the appropriate healthcare practitioners for all other medical problems and issues.  4.  Patient will provide immunization date for Pneumovax at next follow-up office visit.  Patient is planning to obtain the flu vaccination today.        Follow-up: Return in  about 6 months (around 3/24/2022) for VIJI. or sooner if needed.     COSTA Teixeira.    This dictation was created using voice recognition software. The accuracy of the dictation is limited to the abilities of the software. I expect there may be some errors of grammar and possibly content.

## 2021-10-07 ENCOUNTER — HOSPITAL ENCOUNTER (OUTPATIENT)
Dept: LAB | Facility: MEDICAL CENTER | Age: 71
End: 2021-10-07
Attending: STUDENT IN AN ORGANIZED HEALTH CARE EDUCATION/TRAINING PROGRAM
Payer: MEDICARE

## 2021-10-07 LAB
ALBUMIN SERPL BCP-MCNC: 4.6 G/DL (ref 3.2–4.9)
ALBUMIN/GLOB SERPL: 1.3 G/DL
ALP SERPL-CCNC: 100 U/L (ref 30–99)
ALT SERPL-CCNC: 18 U/L (ref 2–50)
ANION GAP SERPL CALC-SCNC: 12 MMOL/L (ref 7–16)
APPEARANCE UR: CLEAR
AST SERPL-CCNC: 20 U/L (ref 12–45)
BASOPHILS # BLD AUTO: 0.5 % (ref 0–1.8)
BASOPHILS # BLD: 0.05 K/UL (ref 0–0.12)
BILIRUB SERPL-MCNC: 0.4 MG/DL (ref 0.1–1.5)
BILIRUB UR QL STRIP.AUTO: NEGATIVE
BUN SERPL-MCNC: 14 MG/DL (ref 8–22)
CALCIUM SERPL-MCNC: 10 MG/DL (ref 8.5–10.5)
CHLORIDE SERPL-SCNC: 101 MMOL/L (ref 96–112)
CHOLEST SERPL-MCNC: 146 MG/DL (ref 100–199)
CO2 SERPL-SCNC: 27 MMOL/L (ref 20–33)
COLOR UR: YELLOW
CREAT SERPL-MCNC: 0.74 MG/DL (ref 0.5–1.4)
EOSINOPHIL # BLD AUTO: 0.24 K/UL (ref 0–0.51)
EOSINOPHIL NFR BLD: 2.5 % (ref 0–6.9)
ERYTHROCYTE [DISTWIDTH] IN BLOOD BY AUTOMATED COUNT: 44.5 FL (ref 35.9–50)
EST. AVERAGE GLUCOSE BLD GHB EST-MCNC: 123 MG/DL
FASTING STATUS PATIENT QL REPORTED: NORMAL
GLOBULIN SER CALC-MCNC: 3.5 G/DL (ref 1.9–3.5)
GLUCOSE SERPL-MCNC: 118 MG/DL (ref 65–99)
GLUCOSE UR STRIP.AUTO-MCNC: NEGATIVE MG/DL
HBA1C MFR BLD: 5.9 % (ref 4–5.6)
HCT VFR BLD AUTO: 44.4 % (ref 37–47)
HDLC SERPL-MCNC: 51 MG/DL
HGB BLD-MCNC: 14.5 G/DL (ref 12–16)
IMM GRANULOCYTES # BLD AUTO: 0.03 K/UL (ref 0–0.11)
IMM GRANULOCYTES NFR BLD AUTO: 0.3 % (ref 0–0.9)
KETONES UR STRIP.AUTO-MCNC: NEGATIVE MG/DL
LDLC SERPL CALC-MCNC: 76 MG/DL
LEUKOCYTE ESTERASE UR QL STRIP.AUTO: NEGATIVE
LYMPHOCYTES # BLD AUTO: 2.14 K/UL (ref 1–4.8)
LYMPHOCYTES NFR BLD: 22 % (ref 22–41)
MCH RBC QN AUTO: 28.8 PG (ref 27–33)
MCHC RBC AUTO-ENTMCNC: 32.7 G/DL (ref 33.6–35)
MCV RBC AUTO: 88.3 FL (ref 81.4–97.8)
MICRO URNS: NORMAL
MONOCYTES # BLD AUTO: 0.49 K/UL (ref 0–0.85)
MONOCYTES NFR BLD AUTO: 5 % (ref 0–13.4)
NEUTROPHILS # BLD AUTO: 6.79 K/UL (ref 2–7.15)
NEUTROPHILS NFR BLD: 69.7 % (ref 44–72)
NITRITE UR QL STRIP.AUTO: NEGATIVE
NRBC # BLD AUTO: 0 K/UL
NRBC BLD-RTO: 0 /100 WBC
PH UR STRIP.AUTO: 6.5 [PH] (ref 5–8)
PLATELET # BLD AUTO: 308 K/UL (ref 164–446)
PMV BLD AUTO: 11.5 FL (ref 9–12.9)
POTASSIUM SERPL-SCNC: 4.9 MMOL/L (ref 3.6–5.5)
PROT SERPL-MCNC: 8.1 G/DL (ref 6–8.2)
PROT UR QL STRIP: NEGATIVE MG/DL
RBC # BLD AUTO: 5.03 M/UL (ref 4.2–5.4)
RBC UR QL AUTO: NEGATIVE
SODIUM SERPL-SCNC: 140 MMOL/L (ref 135–145)
SP GR UR STRIP.AUTO: 1.01
TRIGL SERPL-MCNC: 96 MG/DL (ref 0–149)
TSH SERPL DL<=0.005 MIU/L-ACNC: 1.22 UIU/ML (ref 0.38–5.33)
UROBILINOGEN UR STRIP.AUTO-MCNC: 0.2 MG/DL
WBC # BLD AUTO: 9.7 K/UL (ref 4.8–10.8)

## 2021-10-07 PROCEDURE — 80053 COMPREHEN METABOLIC PANEL: CPT

## 2021-10-07 PROCEDURE — 85025 COMPLETE CBC W/AUTO DIFF WBC: CPT

## 2021-10-07 PROCEDURE — 83036 HEMOGLOBIN GLYCOSYLATED A1C: CPT | Mod: GA

## 2021-10-07 PROCEDURE — 81003 URINALYSIS AUTO W/O SCOPE: CPT

## 2021-10-07 PROCEDURE — 80061 LIPID PANEL: CPT

## 2021-10-07 PROCEDURE — 36415 COLL VENOUS BLD VENIPUNCTURE: CPT

## 2021-10-07 PROCEDURE — 84443 ASSAY THYROID STIM HORMONE: CPT

## 2021-10-15 ENCOUNTER — OFFICE VISIT (OUTPATIENT)
Dept: CARDIOLOGY | Facility: MEDICAL CENTER | Age: 71
End: 2021-10-15
Payer: MEDICARE

## 2021-10-15 VITALS
WEIGHT: 262.8 LBS | DIASTOLIC BLOOD PRESSURE: 74 MMHG | BODY MASS INDEX: 42.23 KG/M2 | OXYGEN SATURATION: 94 % | HEIGHT: 66 IN | SYSTOLIC BLOOD PRESSURE: 146 MMHG | HEART RATE: 88 BPM | RESPIRATION RATE: 16 BRPM

## 2021-10-15 DIAGNOSIS — E78.2 MIXED HYPERLIPIDEMIA: ICD-10-CM

## 2021-10-15 DIAGNOSIS — Z79.899 HIGH RISK MEDICATION USE: ICD-10-CM

## 2021-10-15 DIAGNOSIS — G47.33 OBSTRUCTIVE SLEEP APNEA SYNDROME: ICD-10-CM

## 2021-10-15 DIAGNOSIS — E66.01 MORBID OBESITY WITH BMI OF 40.0-44.9, ADULT (HCC): ICD-10-CM

## 2021-10-15 DIAGNOSIS — I35.0 AORTIC STENOSIS, MODERATE: ICD-10-CM

## 2021-10-15 DIAGNOSIS — I10 HTN (HYPERTENSION), MALIGNANT: ICD-10-CM

## 2021-10-15 PROCEDURE — 99214 OFFICE O/P EST MOD 30 MIN: CPT | Performed by: NURSE PRACTITIONER

## 2021-10-15 RX ORDER — MIRABEGRON 25 MG/1
TABLET, FILM COATED, EXTENDED RELEASE ORAL DAILY
COMMUNITY

## 2021-10-15 RX ORDER — LOSARTAN POTASSIUM 100 MG/1
100 TABLET ORAL
Qty: 90 TABLET | Refills: 3 | Status: SHIPPED | OUTPATIENT
Start: 2021-10-15 | End: 2022-08-30 | Stop reason: SDUPTHER

## 2021-10-15 ASSESSMENT — ENCOUNTER SYMPTOMS
DIZZINESS: 0
COUGH: 0
CLAUDICATION: 0
FEVER: 0
PND: 0
ORTHOPNEA: 0
SHORTNESS OF BREATH: 0
ABDOMINAL PAIN: 0
PALPITATIONS: 0
MYALGIAS: 0

## 2021-10-15 ASSESSMENT — FIBROSIS 4 INDEX: FIB4 SCORE: 1.09

## 2021-10-15 NOTE — PROGRESS NOTES
Chief Complaint   Patient presents with   • Palpitations   • Supraventricular Tachycardia (SVT)   • Aortic Stenosis     F/V Dx: Aortic stenosis, moderate       Subjective:   Anel Schaeffer is a 71 y.o. female who presents today for follow up on her hypertension, aortic stenosis    Patient of Dr. Sanchez.  Patient was last seen in clinic on 5/17/2021.  During that visit, no changes were made.    Patient reports recently coming home after an extended RV trip. Patient does report having some recent stress due to her 's health and possibly having to put down their dog.    Her  did get some stents placed. Patient is glad that he is doing better.    She otherwise reports some episodes of fatigue, but denies chest pain, palpitations, orthopnea, PND, edema, shortness of breath or dizziness/lightheadedness. She also is reporting some episodes of hot flashes.    She does admit to stress eating recently. She has been trying to lose weight, but has not been maintaining the weight loss.    She does not check her blood pressure regularly at home.    Patient also is being treated by primary care for urinary incontinence, and was recently started on Myrbetriq.    She plans on going back on an RV trip and has a a cruise vacation scheduled next year. She will not be back in town until May 2022.    Additonally, patient has the following medical problems:    -Hospitalization from 4/29/2021 through 5/1/2021.  Initially, patient was found to be hypertensive.  Echocardiogram was performed which showed moderate aortic stenosis.  She did have a cardiac catheterization on 5 1 which showed no evidence of CAD but showed moderate aortic stenosis.  Her blood pressure was treated.      -Hypertension    -Hyperlipidemia    -Environmental allergies    Past Medical History:   Diagnosis Date   • Hypertension     off meds since 2012     Past Surgical History:   Procedure Laterality Date   • ABDOMINAL HYSTERECTOMY TOTAL      1989   •  APPENDECTOMY      1966   • HUMERUS ORIF      3/2014  - right   • LIPOMA EXCISION      left leg   • SHOULDER ARTHROSCOPY      right - bone spurs.  [facility] - ortho   • TONSILLECTOMY      1956   • ULNA ORIF      left - 3/2014 -      Family History   Problem Relation Age of Onset   • Cancer Mother         throat  - smoker   • Other Father         My. Gravis   • Cancer Sister         ovarian - age 67   • Cancer Sister         melanoma     Social History     Socioeconomic History   • Marital status:      Spouse name: Not on file   • Number of children: Not on file   • Years of education: Not on file   • Highest education level: Not on file   Occupational History   • Not on file   Tobacco Use   • Smoking status: Never Smoker   • Smokeless tobacco: Never Used   Vaping Use   • Vaping Use: Never used   Substance and Sexual Activity   • Alcohol use: Yes     Comment: socially    • Drug use: Yes     Frequency: 7.0 times per week     Types: Marijuana, Oral     Comment: for sleep    • Sexual activity: Yes     Birth control/protection: Surgical     Comment: ; two boys; wk:  [employer]   Other Topics Concern   • Not on file   Social History Narrative   • Not on file     Social Determinants of Health     Financial Resource Strain:    • Difficulty of Paying Living Expenses:    Food Insecurity:    • Worried About Running Out of Food in the Last Year:    • Ran Out of Food in the Last Year:    Transportation Needs:    • Lack of Transportation (Medical):    • Lack of Transportation (Non-Medical):    Physical Activity:    • Days of Exercise per Week:    • Minutes of Exercise per Session:    Stress:    • Feeling of Stress :    Social Connections:    • Frequency of Communication with Friends and Family:    • Frequency of Social Gatherings with Friends and Family:    • Attends Jewish Services:    • Active Member of Clubs or Organizations:    • Attends Club or Organization Meetings:    • Marital  "Status:    Intimate Partner Violence:    • Fear of Current or Ex-Partner:    • Emotionally Abused:    • Physically Abused:    • Sexually Abused:      No Known Allergies  Outpatient Encounter Medications as of 10/15/2021   Medication Sig Dispense Refill   • Mirabegron ER (MYRBETRIQ) 25 MG TABLET SR 24 HR Take  by mouth every day.     • losartan (COZAAR) 100 MG Tab Take 1 Tablet by mouth every day. 90 Tablet 3   • spironolactone (ALDACTONE) 25 MG Tab Take 1 tablet by mouth every day. 90 tablet 3   • omeprazole (PRILOSEC) 20 MG delayed-release capsule Take 20 mg by mouth every day.     • atorvastatin (LIPITOR) 10 MG Tab Take 10 mg by mouth.     • cetirizine (ZYRTEC ALLERGY) 10 MG Tab Take 10 mg by mouth every day.     • aspirin (ASA) 81 MG Chew Tab chewable tablet Chew 81 mg every evening.     • ascorbic acid (VITAMIN C) 500 MG tablet Take 1,000 mg by mouth every day.     • Cholecalciferol (VITAMIN D3 PO) Take 1 Each by mouth every day.     • ZINC SULFATE PO Take 220 mg by mouth every day.     • [DISCONTINUED] losartan (COZAAR) 50 MG Tab Take 1.5 Tablets by mouth every day. 135 tablet 3     No facility-administered encounter medications on file as of 10/15/2021.     Review of Systems   Constitutional: Positive for malaise/fatigue (mild). Negative for fever.        Hot flashes at night   Respiratory: Negative for cough and shortness of breath.    Cardiovascular: Negative for chest pain, palpitations, orthopnea, claudication, leg swelling and PND.   Gastrointestinal: Negative for abdominal pain.   Genitourinary:        Urinary incontinence   Musculoskeletal: Negative for myalgias.   Neurological: Negative for dizziness.   All other systems reviewed and are negative.       Objective:   /74 (BP Location: Left arm, Patient Position: Sitting, BP Cuff Size: Adult)   Pulse 88   Resp 16   Ht 1.676 m (5' 6\")   Wt 119 kg (262 lb 12.8 oz)   SpO2 94%   BMI 42.42 kg/m²     Physical Exam  Vitals reviewed. "   Constitutional:       Appearance: She is well-developed. She is obese.   HENT:      Head: Normocephalic and atraumatic.   Eyes:      Pupils: Pupils are equal, round, and reactive to light.   Neck:      Vascular: No JVD.   Cardiovascular:      Rate and Rhythm: Normal rate and regular rhythm.      Heart sounds: Murmur heard.     Pulmonary:      Effort: Pulmonary effort is normal. No respiratory distress.      Breath sounds: Normal breath sounds. No wheezing or rales.   Abdominal:      General: Bowel sounds are normal.      Palpations: Abdomen is soft.   Musculoskeletal:      Cervical back: Normal range of motion and neck supple.      Right lower leg: No edema.      Left lower leg: No edema.   Skin:     General: Skin is warm and dry.   Neurological:      Mental Status: She is alert and oriented to person, place, and time.   Psychiatric:         Behavior: Behavior normal.       Lab Results   Component Value Date/Time    CHOLSTRLTOT 146 10/07/2021 11:20 AM    LDL 76 10/07/2021 11:20 AM    HDL 51 10/07/2021 11:20 AM    TRIGLYCERIDE 96 10/07/2021 11:20 AM       Lab Results   Component Value Date/Time    SODIUM 140 10/07/2021 11:20 AM    POTASSIUM 4.9 10/07/2021 11:20 AM    CHLORIDE 101 10/07/2021 11:20 AM    CO2 27 10/07/2021 11:20 AM    GLUCOSE 118 (H) 10/07/2021 11:20 AM    BUN 14 10/07/2021 11:20 AM    CREATININE 0.74 10/07/2021 11:20 AM     Lab Results   Component Value Date/Time    ALKPHOSPHAT 100 (H) 10/07/2021 11:20 AM    ASTSGOT 20 10/07/2021 11:20 AM    ALTSGPT 18 10/07/2021 11:20 AM    TBILIRUBIN 0.4 10/07/2021 11:20 AM      Transthoracic Echo Report 4/29/2021  No prior study is available for comparison.   Technically difficult study - adequate information is obtained.   Normal left ventricular systolic function.  Left ventricular ejection fraction is visually estimated to be 70%.  Normal regional wall motion.  Contrast was used to enhance visualization of the endocardial border.  Normal right ventricular size  and systolic function.  Mild mitral annular calcification.  No mitral regurgitation.  Moderate aortic stenosis.  Transvalvular gradients are - Peak: 36 mmHg, Mean: 26 mmHg.  Normal pericardium without effusion.  Normal aortic root for body surface area.    Cardiac catheterization 05/01/2021      REFERRING PHYSICIAN:  Joshua Sanchez MD     PROCEDURES:  1.  Left heart catheterization.  2.  Coronary angiography.  3.  Left ventriculogram.  4.  Ascending aortogram.  5.  Descending aortogram.  6.  Monitor conscious sedation.     PREPROCEDURE DIAGNOSES:  1.  Chest pain.  2.  Aortic stenosis.     POSTPROCEDURE DIAGNOSES:  1.  No angiographic evidence of coronary artery disease.  2.  Normal left ventricular systolic function with ejection fraction of 65%.  3.  Elevated left ventricular end diastolic pressure.  4.  Aortic stenosis with peak-to-peak gradient of 40 mmHg.     Assessment:     1. HTN (hypertension), malignant  Basic Metabolic Panel   2. Aortic stenosis, moderate  REFERRAL TO CARDIOLOGY    Basic Metabolic Panel   3. High risk medication use  Basic Metabolic Panel   4. Mixed hyperlipidemia     5. Morbid obesity with BMI of 40.0-44.9, adult (HCC)     6. Obstructive sleep apnea syndrome         Medical Decision Making:  Today's Assessment / Status / Plan:   1.  Aortic stenosis: Moderate per echo and heart cath  -Will follow  -Patient currently asymptomatic  -At this visit, patient referred over to structural heart program for surveillance. Patient hopefully to get an appointment scheduled before mid November, if not she will have to follow-up with them in May.    2.  Hypertension: Borderline elevated  -Increase losartan 200 mg daily  -Continue spironolactone 25 mg daily  -Obtain a BMP in 1 to 2 weeks  -Monitor and log Blood pressures at home. Call office or RTC if BP increasing or >180/100 or if symptoms of elevated blood pressure present. Reviewed s/sx of stroke and heart attack. Patient to go to ER or call  911 if present.     3.  Hyperlipidemia:  - Last LDL 76 on 10/7/2021  -Continue atorvastatin 10 mg daily  -Continue aspirin 81 mg daily    4.  Sleep apnea: Moderate  -Followed by sleep medicine  -Continue regular use of auto CPAP    5.  Obese: BMI 42.42  -Encouraged weight loss    BMP in 6 months    FU in clinic in 6 months with Dr. Sanchez and labs. Sooner if needed.    Patient verbalizes understanding and agrees with the plan of care.     PLEASE NOTE: This Note was created using voice recognition Software. I have made every reasonable attempt to correct obvious errors, but I expect that there are errors of grammar and possibly content that I did not discover before finalizing the note

## 2021-10-21 ENCOUNTER — TELEPHONE (OUTPATIENT)
Dept: CARDIOLOGY | Facility: MEDICAL CENTER | Age: 71
End: 2021-10-21

## 2021-10-21 NOTE — TELEPHONE ENCOUNTER
"Spoke with patient regarding [facility] referral placed by MEHDI Minaya.     Patient states she is currently out of state until Nov, and will be unavailable Dec 2021- June 2022, as patient \"[place] the [place]\".     Explained that this RN will place patient on wait list so that if any consultations do become available during specific dates Nov 2021, we can try to schedule consultation at that time.     Patient agrees with plan and thankful for call.    SC- HUSSEIN unable to schedule new [facility] surv apt at this time due to patient travel schedule/ clinic availability.  "

## 2022-06-23 ENCOUNTER — HOSPITAL ENCOUNTER (OUTPATIENT)
Dept: RADIOLOGY | Facility: MEDICAL CENTER | Age: 72
End: 2022-06-23
Attending: STUDENT IN AN ORGANIZED HEALTH CARE EDUCATION/TRAINING PROGRAM
Payer: MEDICARE

## 2022-06-23 DIAGNOSIS — Z12.31 VISIT FOR SCREENING MAMMOGRAM: ICD-10-CM

## 2022-06-23 PROCEDURE — 77063 BREAST TOMOSYNTHESIS BI: CPT

## 2022-06-27 ENCOUNTER — PATIENT MESSAGE (OUTPATIENT)
Dept: CARDIOLOGY | Facility: MEDICAL CENTER | Age: 72
End: 2022-06-27
Payer: MEDICARE

## 2022-06-28 ENCOUNTER — TELEPHONE (OUTPATIENT)
Dept: CARDIOLOGY | Facility: MEDICAL CENTER | Age: 72
End: 2022-06-28
Payer: MEDICARE

## 2022-06-28 DIAGNOSIS — I35.0 AORTIC STENOSIS, MODERATE: ICD-10-CM

## 2022-06-28 NOTE — TELEPHONE ENCOUNTER
No imaging was ordered during her last appointment. However, pt does have moderate AS per last echo 4/2021.   S/w pt for more information. She was referred to the valve program from AS surveillance but then could not be seen in time before they left out of state for several months. She explains that she is officially moving to [place] in a couple of months so would prefer to hold off and valve consultation until after she moves.     She admits to intermittent lightheadedness but associates this with allergies and congestion. She denies CP, shortness of breath, and fatigue.     To MYRNA: would a repeat echo by indicated prior to pt follow up 8/8 for moderate AS?    --------------------------------------------------------------  Imaging    Anel Schaeffer Ruth, A.P.R.N. Yesterday (9:46 AM)     MA      You wanted me to have some Imaging test done and I have called to schedule but they cannot find the order.  You originally requested in 10/21 but they. could not get me in till after 1/22 and we were not here.  I am here till 8/10 and would like to get it done if I can. Can I get a new order for this? I have an appt with you on 8/8.     Thank you,  Anel Schaeffer

## 2022-07-08 NOTE — TELEPHONE ENCOUNTER
Please let her know I did not order any imaging tests, but I did order a BMP.  If she can get an echo before follow-up that is fine, but I still would like her to follow-up with the valve clinic in case she worsens before she moves.

## 2022-08-08 ENCOUNTER — TELEPHONE (OUTPATIENT)
Dept: CARDIOLOGY | Facility: MEDICAL CENTER | Age: 72
End: 2022-08-08
Payer: MEDICARE

## 2022-08-08 ENCOUNTER — TELEPHONE (OUTPATIENT)
Dept: CARDIOLOGY | Facility: MEDICAL CENTER | Age: 72
End: 2022-08-08

## 2022-08-08 NOTE — TELEPHONE ENCOUNTER
Called patient back. She informed me she will be moving to [place] permanently and plans on asking [facility]ARDING for a referral to a cardiologist down there at her next appt this month. Patient will follow up with her AS with her new MD in [place].

## 2022-08-08 NOTE — TELEPHONE ENCOUNTER
Mumtaz Badillo-    Tried to reach you 2x, Pt just missed your call.  Please give her a call back @ :  [phone].    Thank You  Seema BERGMAN

## 2022-08-08 NOTE — TELEPHONE ENCOUNTER
Seema Russell, Med Ass't 33 minutes ago (4:07 PM)     REBECA Badillo-     Tried to reach you 2x, Pt just missed your call.  Please give her a call back @ :  [phone].     Thank You  Seema BERGMAN

## 2022-08-08 NOTE — TELEPHONE ENCOUNTER
In reviewing [facility] surveillance spreadsheet, patient has not yet scheduled her consult for mod AS.    LVM for patient to call back and schedule. Letter mailed.

## 2022-08-22 DIAGNOSIS — I35.0 AORTIC STENOSIS, MODERATE: ICD-10-CM

## 2022-08-23 ENCOUNTER — HOSPITAL ENCOUNTER (OUTPATIENT)
Dept: LAB | Facility: MEDICAL CENTER | Age: 72
End: 2022-08-23
Attending: NURSE PRACTITIONER
Payer: MEDICARE

## 2022-08-23 DIAGNOSIS — Z79.899 HIGH RISK MEDICATION USE: ICD-10-CM

## 2022-08-23 DIAGNOSIS — I10 HTN (HYPERTENSION), MALIGNANT: ICD-10-CM

## 2022-08-23 DIAGNOSIS — I35.0 AORTIC STENOSIS, MODERATE: ICD-10-CM

## 2022-08-23 LAB
ANION GAP SERPL CALC-SCNC: 14 MMOL/L (ref 7–16)
BUN SERPL-MCNC: 15 MG/DL (ref 8–22)
CALCIUM SERPL-MCNC: 9.6 MG/DL (ref 8.5–10.5)
CHLORIDE SERPL-SCNC: 102 MMOL/L (ref 96–112)
CO2 SERPL-SCNC: 24 MMOL/L (ref 20–33)
CREAT SERPL-MCNC: 0.7 MG/DL (ref 0.5–1.4)
GFR SERPLBLD CREATININE-BSD FMLA CKD-EPI: 92 ML/MIN/1.73 M 2
GLUCOSE SERPL-MCNC: 103 MG/DL (ref 65–99)
POTASSIUM SERPL-SCNC: 5 MMOL/L (ref 3.6–5.5)
SODIUM SERPL-SCNC: 140 MMOL/L (ref 135–145)

## 2022-08-23 PROCEDURE — 36415 COLL VENOUS BLD VENIPUNCTURE: CPT

## 2022-08-23 PROCEDURE — 80048 BASIC METABOLIC PNL TOTAL CA: CPT

## 2022-08-30 ENCOUNTER — OFFICE VISIT (OUTPATIENT)
Dept: CARDIOLOGY | Facility: MEDICAL CENTER | Age: 72
End: 2022-08-30
Payer: MEDICARE

## 2022-08-30 VITALS
BODY MASS INDEX: 40.18 KG/M2 | OXYGEN SATURATION: 94 % | RESPIRATION RATE: 17 BRPM | WEIGHT: 250 LBS | DIASTOLIC BLOOD PRESSURE: 78 MMHG | HEART RATE: 94 BPM | HEIGHT: 66 IN | SYSTOLIC BLOOD PRESSURE: 126 MMHG

## 2022-08-30 DIAGNOSIS — I10 HTN (HYPERTENSION), MALIGNANT: ICD-10-CM

## 2022-08-30 DIAGNOSIS — Z79.899 HIGH RISK MEDICATION USE: ICD-10-CM

## 2022-08-30 DIAGNOSIS — E78.2 MIXED HYPERLIPIDEMIA: ICD-10-CM

## 2022-08-30 DIAGNOSIS — I35.0 AORTIC STENOSIS, MODERATE: ICD-10-CM

## 2022-08-30 DIAGNOSIS — G47.33 OBSTRUCTIVE SLEEP APNEA SYNDROME: ICD-10-CM

## 2022-08-30 DIAGNOSIS — E66.01 MORBID OBESITY WITH BMI OF 40.0-44.9, ADULT (HCC): ICD-10-CM

## 2022-08-30 PROCEDURE — 99214 OFFICE O/P EST MOD 30 MIN: CPT | Performed by: NURSE PRACTITIONER

## 2022-08-30 RX ORDER — ATORVASTATIN CALCIUM 10 MG/1
10 TABLET, FILM COATED ORAL DAILY
Qty: 90 TABLET | Refills: 2 | Status: SHIPPED | OUTPATIENT
Start: 2022-08-30

## 2022-08-30 RX ORDER — LOSARTAN POTASSIUM 100 MG/1
100 TABLET ORAL
Qty: 90 TABLET | Refills: 2 | Status: SHIPPED | OUTPATIENT
Start: 2022-08-30

## 2022-08-30 RX ORDER — SPIRONOLACTONE 25 MG/1
25 TABLET ORAL DAILY
Qty: 90 TABLET | Refills: 2 | Status: SHIPPED | OUTPATIENT
Start: 2022-08-30 | End: 2023-05-30

## 2022-08-30 ASSESSMENT — ENCOUNTER SYMPTOMS
ABDOMINAL PAIN: 0
PALPITATIONS: 0
FEVER: 0
PND: 0
ORTHOPNEA: 0
MYALGIAS: 0
CLAUDICATION: 0
SHORTNESS OF BREATH: 0
DIZZINESS: 0
COUGH: 0

## 2022-08-30 ASSESSMENT — FIBROSIS 4 INDEX: FIB4 SCORE: 1.1

## 2022-08-30 NOTE — PROGRESS NOTES
Chief Complaint   Patient presents with    Hypertension     F/V DX: HTN (hypertension), malignant        Subjective:   Anel Schaeffer is a 72 y.o. female who presents today for follow up on her hypertension, aortic stenosis with her , Sonny.     Patient of Dr. Sanchez.  Patient was last seen in clinic on 10/15/2021.  During that visit, losartan was increased to 100 mg daily for hypertension.    Patient comes in the clinic reporting that her and her  will be moving to [place] in the next couple of days.    She reports she has been doing well.  She denies chest pain, palpitations, orthopnea, PND, edema, shortness of breath or significant dizziness/lightheadedness.  She does mention on occasion if she is sleeping at night if she turns on her left side quickly she feels a little dizzy.    Patient mentions she gets tired if she exerts too much.    She reports she has lost some weight and is trying to eat better.    Additonally, patient has the following medical problems:    -Hospitalization from 4/29/2021 through 5/1/2021.  Initially, patient was found to be hypertensive.  Echocardiogram was performed which showed moderate aortic stenosis.  She did have a cardiac catheterization on 5 1 which showed no evidence of CAD but showed moderate aortic stenosis.  Her blood pressure was treated.    -Hypertension    -Hyperlipidemia    -Environmental allergies    Past Medical History:   Diagnosis Date    Hypertension     off meds since 2012     Past Surgical History:   Procedure Laterality Date    ABDOMINAL HYSTERECTOMY TOTAL      1989    APPENDECTOMY      1966    LIPOMA EXCISION      left leg    ORIF, FRACTURE, HUMERUS      3/2014  - right    ORIF, FRACTURE, ULNA      left - 3/2014 -     SHOULDER ARTHROSCOPY      right - bone spurs.  [facility] - ortho    TONSILLECTOMY      1956     Family History   Problem Relation Age of Onset    Cancer Mother         throat  - smoker    Other Father         My. Gravis     Cancer Sister         ovarian - age 67    Cancer Sister         melanoma     Social History     Socioeconomic History    Marital status:      Spouse name: Not on file    Number of children: Not on file    Years of education: Not on file    Highest education level: Not on file   Occupational History    Not on file   Tobacco Use    Smoking status: Never    Smokeless tobacco: Never   Vaping Use    Vaping Use: Never used   Substance and Sexual Activity    Alcohol use: Yes     Comment: socially     Drug use: Yes     Frequency: 7.0 times per week     Types: Marijuana, Oral     Comment: for sleep     Sexual activity: Yes     Birth control/protection: Surgical     Comment: ; two boys; wk:  [employer]   Other Topics Concern    Not on file   Social History Narrative    Not on file     Social Determinants of Health     Financial Resource Strain: Not on file   Food Insecurity: Not on file   Transportation Needs: Not on file   Physical Activity: Not on file   Stress: Not on file   Social Connections: Not on file   Intimate Partner Violence: Not on file   Housing Stability: Not on file     No Known Allergies  Outpatient Encounter Medications as of 8/30/2022   Medication Sig Dispense Refill    atorvastatin (LIPITOR) 10 MG Tab Take 1 Tablet by mouth every day. 90 Tablet 2    losartan (COZAAR) 100 MG Tab Take 1 Tablet by mouth every day. 90 Tablet 2    spironolactone (ALDACTONE) 25 MG Tab Take 1 Tablet by mouth every day. 90 Tablet 2    Mirabegron ER (MYRBETRIQ) 25 MG TABLET SR 24 HR Take  by mouth every day.      omeprazole (PRILOSEC) 20 MG delayed-release capsule Take 20 mg by mouth every day.      cetirizine (ZYRTEC) 10 MG Tab Take 10 mg by mouth every day.      aspirin (ASA) 81 MG Chew Tab chewable tablet Chew 81 mg every evening.      ascorbic acid (VITAMIN C) 500 MG tablet Take 1,000 mg by mouth every day.      Cholecalciferol (VITAMIN D3 PO) Take 1 Each by mouth every day.      ZINC SULFATE PO Take  "220 mg by mouth every day.      [DISCONTINUED] spironolactone (ALDACTONE) 25 MG Tab TAKE 1 TABLET BY MOUTH EVERY DAY 90 Tablet 1    [DISCONTINUED] losartan (COZAAR) 100 MG Tab Take 1 Tablet by mouth every day. 90 Tablet 3    [DISCONTINUED] atorvastatin (LIPITOR) 10 MG Tab Take 10 mg by mouth.       No facility-administered encounter medications on file as of 8/30/2022.     Review of Systems   Constitutional:  Positive for malaise/fatigue (mild). Negative for fever.        Hot flashes at night   Respiratory:  Negative for cough and shortness of breath.    Cardiovascular:  Negative for chest pain, palpitations, orthopnea, claudication, leg swelling and PND.   Gastrointestinal:  Negative for abdominal pain.   Genitourinary:         Urinary incontinence   Musculoskeletal:  Negative for myalgias.   Neurological:  Negative for dizziness.   All other systems reviewed and are negative.     Objective:   /78 (BP Location: Left arm, Patient Position: Sitting, BP Cuff Size: Adult)   Pulse 94   Resp 17   Ht 1.676 m (5' 6\")   Wt 113 kg (250 lb)   SpO2 94%   BMI 40.35 kg/m²     Physical Exam  Vitals reviewed.   Constitutional:       Appearance: She is well-developed. She is obese.   HENT:      Head: Normocephalic and atraumatic.   Eyes:      Pupils: Pupils are equal, round, and reactive to light.   Neck:      Vascular: No JVD.   Cardiovascular:      Rate and Rhythm: Normal rate and regular rhythm.      Heart sounds: Murmur heard.   Pulmonary:      Effort: Pulmonary effort is normal. No respiratory distress.      Breath sounds: Normal breath sounds. No wheezing or rales.   Abdominal:      General: Bowel sounds are normal.      Palpations: Abdomen is soft.   Musculoskeletal:      Cervical back: Normal range of motion and neck supple.      Right lower leg: No edema.      Left lower leg: No edema.   Skin:     General: Skin is warm and dry.   Neurological:      Mental Status: She is alert and oriented to person, place, and " time.   Psychiatric:         Behavior: Behavior normal.     Lab Results   Component Value Date/Time    CHOLSTRLTOT 146 10/07/2021 11:20 AM    LDL 76 10/07/2021 11:20 AM    HDL 51 10/07/2021 11:20 AM    TRIGLYCERIDE 96 10/07/2021 11:20 AM       Lab Results   Component Value Date/Time    SODIUM 140 08/23/2022 11:06 AM    POTASSIUM 5.0 08/23/2022 11:06 AM    CHLORIDE 102 08/23/2022 11:06 AM    CO2 24 08/23/2022 11:06 AM    GLUCOSE 103 (H) 08/23/2022 11:06 AM    BUN 15 08/23/2022 11:06 AM    CREATININE 0.70 08/23/2022 11:06 AM     Lab Results   Component Value Date/Time    ALKPHOSPHAT 100 (H) 10/07/2021 11:20 AM    ASTSGOT 20 10/07/2021 11:20 AM    ALTSGPT 18 10/07/2021 11:20 AM    TBILIRUBIN 0.4 10/07/2021 11:20 AM      Transthoracic Echo Report 4/29/2021  No prior study is available for comparison.   Technically difficult study - adequate information is obtained.   Normal left ventricular systolic function.  Left ventricular ejection fraction is visually estimated to be 70%.  Normal regional wall motion.  Contrast was used to enhance visualization of the endocardial border.  Normal right ventricular size and systolic function.  Mild mitral annular calcification.  No mitral regurgitation.  Moderate aortic stenosis.  Transvalvular gradients are - Peak: 36 mmHg, Mean: 26 mmHg.  Normal pericardium without effusion.  Normal aortic root for body surface area.    Cardiac catheterization 05/01/2021    REFERRING PHYSICIAN:  Joshua Sanchez MD     PROCEDURES:  1.  Left heart catheterization.  2.  Coronary angiography.  3.  Left ventriculogram.  4.  Ascending aortogram.  5.  Descending aortogram.  6.  Monitor conscious sedation.     PREPROCEDURE DIAGNOSES:  1.  Chest pain.  2.  Aortic stenosis.     POSTPROCEDURE DIAGNOSES:  1.  No angiographic evidence of coronary artery disease.  2.  Normal left ventricular systolic function with ejection fraction of 65%.  3.  Elevated left ventricular end diastolic pressure.  4.  Aortic  stenosis with peak-to-peak gradient of 40 mmHg.    Recent echo from 8/22/2022 reviewed with patient     Assessment:     1. HTN (hypertension), malignant  spironolactone (ALDACTONE) 25 MG Tab      2. Aortic stenosis, moderate        3. High risk medication use        4. Mixed hyperlipidemia        5. Morbid obesity with BMI of 40.0-44.9, adult (HCC)        6. Obstructive sleep apnea syndrome            Medical Decision Making:  Today's Assessment / Status / Plan:   1.  Aortic stenosis: Moderate per echo and heart cath  -Will follow  -Her aortic stenosis remains stable at moderate AAS  -Patient currently asymptomatic  -Patient is moving to [place], encourage patient to get established with cardiology soon so that she has somebody to follow-up with in [place].    2.  Hypertension: Stable and improved from last visit  -Continue losartan 200 mg daily  -Continue spironolactone 25 mg daily  -Recent lab testing stable  -Monitor and log Blood pressures at home. Call office or RTC if BP increasing or >180/100 or if symptoms of elevated blood pressure present. Reviewed s/sx of stroke and heart attack. Patient to go to ER or call 911 if present.     3.  Hyperlipidemia:  - Last LDL 76 on 10/7/2021  -Continue atorvastatin 10 mg daily  -Continue aspirin 81 mg daily    4.  Sleep apnea: Moderate  -Followed by sleep medicine  -Continue regular use of auto CPAP    5.  Obese: BMI 40.35  -Encouraged continued weight loss    FU in clinic as needed.  Patient is planning on establishing with cardiology in [place].  Medical record release forms given to patient.    It has been a pleasure taking care of this patient.    Patient verbalizes understanding and agrees with the plan of care.     PLEASE NOTE: This Note was created using voice recognition Software. I have made every reasonable attempt to correct obvious errors, but I expect that there are errors of grammar and possibly content that I did not discover before finalizing the  note

## 2023-05-27 DIAGNOSIS — I10 HTN (HYPERTENSION), MALIGNANT: ICD-10-CM

## 2023-05-30 RX ORDER — SPIRONOLACTONE 25 MG/1
25 TABLET ORAL DAILY
Qty: 90 TABLET | Refills: 0 | Status: SHIPPED | OUTPATIENT
Start: 2023-05-30 | End: 2023-08-30

## 2023-08-26 DIAGNOSIS — I10 HTN (HYPERTENSION), MALIGNANT: ICD-10-CM

## 2023-08-26 DIAGNOSIS — Z79.899 HIGH RISK MEDICATION USE: ICD-10-CM

## 2023-08-26 DIAGNOSIS — E78.2 MIXED HYPERLIPIDEMIA: ICD-10-CM

## 2023-08-30 RX ORDER — SPIRONOLACTONE 25 MG/1
25 TABLET ORAL DAILY
Qty: 90 TABLET | Refills: 0 | Status: SHIPPED | OUTPATIENT
Start: 2023-08-30